# Patient Record
Sex: MALE | Race: WHITE
[De-identification: names, ages, dates, MRNs, and addresses within clinical notes are randomized per-mention and may not be internally consistent; named-entity substitution may affect disease eponyms.]

---

## 2023-03-13 PROBLEM — Z00.00 ENCOUNTER FOR PREVENTIVE HEALTH EXAMINATION: Status: ACTIVE | Noted: 2023-03-13

## 2023-03-14 ENCOUNTER — APPOINTMENT (OUTPATIENT)
Dept: CARDIOLOGY | Facility: CLINIC | Age: 63
End: 2023-03-14
Payer: MEDICARE

## 2023-03-14 VITALS
SYSTOLIC BLOOD PRESSURE: 142 MMHG | BODY MASS INDEX: 24.11 KG/M2 | WEIGHT: 178 LBS | HEIGHT: 72 IN | DIASTOLIC BLOOD PRESSURE: 90 MMHG

## 2023-03-14 DIAGNOSIS — E11.9 TYPE 2 DIABETES MELLITUS W/OUT COMPLICATIONS: ICD-10-CM

## 2023-03-14 DIAGNOSIS — I25.10 ATHEROSCLEROTIC HEART DISEASE OF NATIVE CORONARY ARTERY W/OUT ANGINA PECTORIS: ICD-10-CM

## 2023-03-14 DIAGNOSIS — E78.5 HYPERLIPIDEMIA, UNSPECIFIED: ICD-10-CM

## 2023-03-14 DIAGNOSIS — Z82.49 FAMILY HISTORY OF ISCHEMIC HEART DISEASE AND OTHER DISEASES OF THE CIRCULATORY SYSTEM: ICD-10-CM

## 2023-03-14 DIAGNOSIS — I10 ESSENTIAL (PRIMARY) HYPERTENSION: ICD-10-CM

## 2023-03-14 DIAGNOSIS — Z95.1 PRESENCE OF AORTOCORONARY BYPASS GRAFT: ICD-10-CM

## 2023-03-14 DIAGNOSIS — Z86.79 PERSONAL HISTORY OF OTHER DISEASES OF THE CIRCULATORY SYSTEM: ICD-10-CM

## 2023-03-14 DIAGNOSIS — Z87.891 PERSONAL HISTORY OF NICOTINE DEPENDENCE: ICD-10-CM

## 2023-03-14 PROCEDURE — 99204 OFFICE O/P NEW MOD 45 MIN: CPT | Mod: 25

## 2023-03-14 PROCEDURE — 36415 COLL VENOUS BLD VENIPUNCTURE: CPT

## 2023-03-14 PROCEDURE — 93000 ELECTROCARDIOGRAM COMPLETE: CPT

## 2023-03-14 RX ORDER — CLOPIDOGREL BISULFATE 75 MG/1
75 TABLET, FILM COATED ORAL DAILY
Qty: 90 | Refills: 3 | Status: ACTIVE | COMMUNITY
Start: 1900-01-01 | End: 1900-01-01

## 2023-03-14 RX ORDER — METOPROLOL SUCCINATE 25 MG/1
25 TABLET, EXTENDED RELEASE ORAL DAILY
Qty: 90 | Refills: 3 | Status: ACTIVE | COMMUNITY
Start: 1900-01-01 | End: 1900-01-01

## 2023-03-14 RX ORDER — ATORVASTATIN CALCIUM 40 MG/1
40 TABLET, FILM COATED ORAL
Qty: 90 | Refills: 3 | Status: ACTIVE | COMMUNITY
Start: 1900-01-01 | End: 1900-01-01

## 2023-03-14 RX ORDER — LISINOPRIL 20 MG/1
20 TABLET ORAL DAILY
Qty: 90 | Refills: 3 | Status: ACTIVE | COMMUNITY
Start: 1900-01-01 | End: 1900-01-01

## 2023-03-14 RX ORDER — METFORMIN HYDROCHLORIDE 1000 MG/1
1000 TABLET, COATED ORAL
Qty: 180 | Refills: 3 | Status: ACTIVE | COMMUNITY
Start: 1900-01-01 | End: 1900-01-01

## 2023-03-14 RX ORDER — EZETIMIBE 10 MG/1
10 TABLET ORAL DAILY
Qty: 90 | Refills: 0 | Status: ACTIVE | COMMUNITY
Start: 1900-01-01 | End: 1900-01-01

## 2023-03-14 NOTE — REASON FOR VISIT
[Other: ____] : [unfilled] [FreeTextEntry1] : Diagnostic Tests:\par ---------------------\par EKG: \par 03/14/23-NSR. STD in inferior leads.

## 2023-03-14 NOTE — HISTORY OF PRESENT ILLNESS
[FreeTextEntry1] : Mr. Parrish is a 63yo M with PMHx of CAD s/p CABG (2017) and PCI (2-3 years), HTN, HLD, DMII who presents to [facility] care. He currently does not have PMD.  He recently moved from [place] where he followed with Dr Cody Haines ([facility]). He feels somewhat down due to stress in his life. He will get some chest discomfort with emotional distress. He is able to walk about 2 miles without symptoms.

## 2023-03-14 NOTE — ASSESSMENT
[FreeTextEntry1] : CAD s/p CABG: Will need to obtain records\par -Check TTE and Lexiscan Nuclear stress. \par -Continue with ASA, plavix, Toprol 25mg, lisinopril 20mg PO and atorvastatin 40mg PO daily. \par -Check labs. \par \par HTN: BP not at goal per ACC/AHA 2018 guidelines\par -Patient has run out of some meds and not taking consistently. \par -Refilled meds.\par -Pt to check BP at home 3 days a week/twice a day. \par -Will keep log and bring to next visit.\par -Continue with lisinopril and metoprolol. \par \par HLD: Need labs\par \par DM: Need A1c\par \par Follow up in 3 months\par -Medicine referral for primary care.

## 2023-03-14 NOTE — REVIEW OF SYSTEMS
[Negative] : Heme/Lymph [Chest Discomfort] : chest discomfort [SOB] : no shortness of breath [Dyspnea on exertion] : not dyspnea during exertion [Palpitations] : no palpitations

## 2023-03-15 LAB
ALBUMIN SERPL ELPH-MCNC: 5.3 G/DL
ALP BLD-CCNC: 139 U/L
ALT SERPL-CCNC: 32 U/L
ANION GAP SERPL CALC-SCNC: 17 MMOL/L
AST SERPL-CCNC: 23 U/L
BASOPHILS # BLD AUTO: 0.07 K/UL
BASOPHILS NFR BLD AUTO: 0.6 %
BILIRUB SERPL-MCNC: 0.6 MG/DL
BUN SERPL-MCNC: 11 MG/DL
CALCIUM SERPL-MCNC: 9.9 MG/DL
CHLORIDE SERPL-SCNC: 101 MMOL/L
CHOLEST SERPL-MCNC: 193 MG/DL
CO2 SERPL-SCNC: 22 MMOL/L
CREAT SERPL-MCNC: 0.9 MG/DL
EGFR: 96 ML/MIN/1.73M2
EOSINOPHIL # BLD AUTO: 0.02 K/UL
EOSINOPHIL NFR BLD AUTO: 0.2 %
ESTIMATED AVERAGE GLUCOSE: 183 MG/DL
GLUCOSE SERPL-MCNC: 138 MG/DL
HBA1C MFR BLD HPLC: 8 %
HCT VFR BLD CALC: 44 %
HDLC SERPL-MCNC: 49 MG/DL
HGB BLD-MCNC: 15.2 G/DL
IMM GRANULOCYTES NFR BLD AUTO: 0.4 %
LDLC SERPL CALC-MCNC: 96 MG/DL
LYMPHOCYTES # BLD AUTO: 1.53 K/UL
LYMPHOCYTES NFR BLD AUTO: 13.6 %
MAN DIFF?: NORMAL
MCHC RBC-ENTMCNC: 31 PG
MCHC RBC-ENTMCNC: 34.5 G/DL
MCV RBC AUTO: 89.6 FL
MONOCYTES # BLD AUTO: 0.6 K/UL
MONOCYTES NFR BLD AUTO: 5.3 %
NEUTROPHILS # BLD AUTO: 9.01 K/UL
NEUTROPHILS NFR BLD AUTO: 79.9 %
NONHDLC SERPL-MCNC: 144 MG/DL
PLATELET # BLD AUTO: 276 K/UL
POTASSIUM SERPL-SCNC: 4.1 MMOL/L
PROT SERPL-MCNC: 7.8 G/DL
RBC # BLD: 4.91 M/UL
RBC # FLD: 12.2 %
SODIUM SERPL-SCNC: 140 MMOL/L
TRIGL SERPL-MCNC: 241 MG/DL
TSH SERPL-ACNC: 2.33 UIU/ML
WBC # FLD AUTO: 11.27 K/UL

## 2023-04-11 ENCOUNTER — RX RENEWAL (OUTPATIENT)
Age: 63
End: 2023-04-11

## 2023-04-11 RX ORDER — SAXAGLIPTIN 5 MG/1
5 TABLET, FILM COATED ORAL DAILY
Qty: 30 | Refills: 0 | Status: ACTIVE | COMMUNITY
Start: 2023-04-11 | End: 1900-01-01

## 2023-05-25 ENCOUNTER — APPOINTMENT (OUTPATIENT)
Dept: CV DIAGNOSITCS | Facility: HOSPITAL | Age: 63
End: 2023-05-25

## 2023-06-11 NOTE — HISTORY OF PRESENT ILLNESS
[FreeTextEntry1] : Mr. Parrish is a 63yo M with PMHx of CAD s/p CABG (2017) and PCI (2-3 years), HTN, HLD, DMII who [facility]. He currently does not have PMD.  He recently moved from [place] where he followed with Dr Cody Haines ([facility]). He feels somewhat down due to stress in his life. He will get some chest discomfort with emotional distress. He is able to walk about 2 miles without symptoms.

## 2023-06-11 NOTE — REVIEW OF SYSTEMS
[SOB] : no shortness of breath [Dyspnea on exertion] : not dyspnea during exertion [Chest Discomfort] : chest discomfort [Palpitations] : no palpitations [Negative] : Heme/Lymph

## 2023-06-11 NOTE — ASSESSMENT
[FreeTextEntry1] : CAD s/p CABG: Will need to obtain records\par -Check TTE and Lexiscan Nuclear stress. \par -Continue with ASA, plavix, Toprol 25mg, lisinopril 20mg PO and atorvastatin 40mg PO daily. \par -Check labs. \par \par HTN: BP not at goal per ACC/AHA 2018 guidelines\par -Patient has run out of some meds and not taking consistently. \par -Refilled meds.\par -Pt to check BP at home 3 days a week/twice a day. \par -Will keep log and bring to next visit.\par -Continue with lisinopril and metoprolol. \par \par HLD: , , HDL 49, LDL 96\par \par DM: HA1c 8% (03/23)\par \par Follow up in 3 months\par -Medicine referral for primary care.

## 2023-06-12 ENCOUNTER — APPOINTMENT (OUTPATIENT)
Dept: CARDIOLOGY | Facility: CLINIC | Age: 63
End: 2023-06-12

## 2023-10-08 ENCOUNTER — INPATIENT (INPATIENT)
Facility: HOSPITAL | Age: 63
LOS: 2 days | Discharge: HOME | DRG: 117 | End: 2023-10-11
Attending: INTERNAL MEDICINE | Admitting: FAMILY MEDICINE
Payer: MEDICARE

## 2023-10-08 VITALS
WEIGHT: 179.9 LBS | OXYGEN SATURATION: 99 % | RESPIRATION RATE: 18 BRPM | HEART RATE: 74 BPM | DIASTOLIC BLOOD PRESSURE: 139 MMHG | SYSTOLIC BLOOD PRESSURE: 220 MMHG | TEMPERATURE: 98 F

## 2023-10-08 LAB
ALBUMIN SERPL ELPH-MCNC: 5.2 G/DL — SIGNIFICANT CHANGE UP (ref 3.5–5.2)
ALP SERPL-CCNC: 164 U/L — HIGH (ref 30–115)
ALT FLD-CCNC: 28 U/L — SIGNIFICANT CHANGE UP (ref 0–41)
ANION GAP SERPL CALC-SCNC: 12 MMOL/L — SIGNIFICANT CHANGE UP (ref 7–14)
APTT BLD: 42.1 SEC — HIGH (ref 27–39.2)
AST SERPL-CCNC: 23 U/L — SIGNIFICANT CHANGE UP (ref 0–41)
BASOPHILS # BLD AUTO: 0.04 K/UL — SIGNIFICANT CHANGE UP (ref 0–0.2)
BASOPHILS NFR BLD AUTO: 0.5 % — SIGNIFICANT CHANGE UP (ref 0–1)
BILIRUB SERPL-MCNC: 0.4 MG/DL — SIGNIFICANT CHANGE UP (ref 0.2–1.2)
BUN SERPL-MCNC: 14 MG/DL — SIGNIFICANT CHANGE UP (ref 10–20)
CALCIUM SERPL-MCNC: 10.1 MG/DL — SIGNIFICANT CHANGE UP (ref 8.4–10.5)
CHLORIDE SERPL-SCNC: 102 MMOL/L — SIGNIFICANT CHANGE UP (ref 98–110)
CO2 SERPL-SCNC: 26 MMOL/L — SIGNIFICANT CHANGE UP (ref 17–32)
CREAT SERPL-MCNC: 0.9 MG/DL — SIGNIFICANT CHANGE UP (ref 0.7–1.5)
EGFR: 95 ML/MIN/1.73M2 — SIGNIFICANT CHANGE UP
EOSINOPHIL # BLD AUTO: 0.06 K/UL — SIGNIFICANT CHANGE UP (ref 0–0.7)
EOSINOPHIL NFR BLD AUTO: 0.7 % — SIGNIFICANT CHANGE UP (ref 0–8)
GLUCOSE SERPL-MCNC: 148 MG/DL — HIGH (ref 70–99)
HCT VFR BLD CALC: 45.8 % — SIGNIFICANT CHANGE UP (ref 42–52)
HGB BLD-MCNC: 16 G/DL — SIGNIFICANT CHANGE UP (ref 14–18)
IMM GRANULOCYTES NFR BLD AUTO: 0.3 % — SIGNIFICANT CHANGE UP (ref 0.1–0.3)
INR BLD: 1.03 RATIO — SIGNIFICANT CHANGE UP (ref 0.65–1.3)
LYMPHOCYTES # BLD AUTO: 1.93 K/UL — SIGNIFICANT CHANGE UP (ref 1.2–3.4)
LYMPHOCYTES # BLD AUTO: 22.1 % — SIGNIFICANT CHANGE UP (ref 20.5–51.1)
MCHC RBC-ENTMCNC: 30.5 PG — SIGNIFICANT CHANGE UP (ref 27–31)
MCHC RBC-ENTMCNC: 34.9 G/DL — SIGNIFICANT CHANGE UP (ref 32–37)
MCV RBC AUTO: 87.2 FL — SIGNIFICANT CHANGE UP (ref 80–94)
MONOCYTES # BLD AUTO: 0.54 K/UL — SIGNIFICANT CHANGE UP (ref 0.1–0.6)
MONOCYTES NFR BLD AUTO: 6.2 % — SIGNIFICANT CHANGE UP (ref 1.7–9.3)
NEUTROPHILS # BLD AUTO: 6.15 K/UL — SIGNIFICANT CHANGE UP (ref 1.4–6.5)
NEUTROPHILS NFR BLD AUTO: 70.2 % — SIGNIFICANT CHANGE UP (ref 42.2–75.2)
NRBC # BLD: 0 /100 WBCS — SIGNIFICANT CHANGE UP (ref 0–0)
PLATELET # BLD AUTO: 245 K/UL — SIGNIFICANT CHANGE UP (ref 130–400)
PMV BLD: 9.5 FL — SIGNIFICANT CHANGE UP (ref 7.4–10.4)
POTASSIUM SERPL-MCNC: 4 MMOL/L — SIGNIFICANT CHANGE UP (ref 3.5–5)
POTASSIUM SERPL-SCNC: 4 MMOL/L — SIGNIFICANT CHANGE UP (ref 3.5–5)
PROT SERPL-MCNC: 8.2 G/DL — HIGH (ref 6–8)
PROTHROM AB SERPL-ACNC: 11.8 SEC — SIGNIFICANT CHANGE UP (ref 9.95–12.87)
RBC # BLD: 5.25 M/UL — SIGNIFICANT CHANGE UP (ref 4.7–6.1)
RBC # FLD: 12.1 % — SIGNIFICANT CHANGE UP (ref 11.5–14.5)
SODIUM SERPL-SCNC: 140 MMOL/L — SIGNIFICANT CHANGE UP (ref 135–146)
TROPONIN T SERPL-MCNC: <0.01 NG/ML — SIGNIFICANT CHANGE UP
WBC # BLD: 8.75 K/UL — SIGNIFICANT CHANGE UP (ref 4.8–10.8)
WBC # FLD AUTO: 8.75 K/UL — SIGNIFICANT CHANGE UP (ref 4.8–10.8)

## 2023-10-08 PROCEDURE — 99285 EMERGENCY DEPT VISIT HI MDM: CPT

## 2023-10-08 PROCEDURE — 70450 CT HEAD/BRAIN W/O DYE: CPT | Mod: 26,MA

## 2023-10-08 PROCEDURE — 70498 CT ANGIOGRAPHY NECK: CPT | Mod: 26,MA

## 2023-10-08 PROCEDURE — 70496 CT ANGIOGRAPHY HEAD: CPT | Mod: 26,MA

## 2023-10-08 PROCEDURE — 93010 ELECTROCARDIOGRAM REPORT: CPT

## 2023-10-08 PROCEDURE — 70480 CT ORBIT/EAR/FOSSA W/O DYE: CPT | Mod: 26,59,MA

## 2023-10-08 NOTE — ED PROVIDER NOTE - OBJECTIVE STATEMENT
63-year-old male with past medical history of HTN, HLD, DM, and CAD s/p CABG/stents presents to the ED for evaluation of right eye visual changes that started on Tuesday and have been worsening.  Patient initially saw some floaters in his right eye but progressively vision has been worsening where he now only sees lights and the top part of his visual field.  He denies history of similar symptoms in the past.  He denies other complaints. Pt denies fever, chills, nausea, vomiting, abdominal pain, diarrhea, headache, dizziness, weakness, chest pain, SOB, back pain, LOC, trauma, urinary symptoms, cough, calf pain/swelling, recent travel, recent surgery.

## 2023-10-08 NOTE — ED ADULT TRIAGE NOTE - CHIEF COMPLAINT QUOTE
pt states he has a floater in his right eye started tuesday. pt /139 on triage pt states he did not take his BP meds.

## 2023-10-08 NOTE — ED PROVIDER NOTE - PROGRESS NOTE DETAILS
Discussed case with ophthalmology, agrees that patient has retinal detachment, recommend transfer [place] so he can evaluate patient in a.m. Discussed case with ophthalmology, agrees that patient has retinal detachment, recommends transfer North so he can evaluate patient in a.m.

## 2023-10-08 NOTE — ED PROVIDER NOTE - ATTENDING APP SHARED VISIT CONTRIBUTION OF CARE
63-year-old male, history of HTN, DM, HLD, CABG, presents with vision changes on the right eye which started last Tuesday.  States his symptoms started as floaters followed by worsening vision and seeing colors.  No headache, slurred speech or focal weakness.  Exam shows alert patient in no distress, HEENT NCAT PERRL, neck supple, lungs clear, RR S1S2, abdomen soft NT +BS, no CCE, skin no rash, neuro A&OX3 GCS 15 no deficits.

## 2023-10-08 NOTE — ED PROVIDER NOTE - CLINICAL SUMMARY MEDICAL DECISION MAKING FREE TEXT BOX
63-year-old male, history of HTN, DM, HLD, CABG, presents to the ED with vision changes in the right eye since Tuesday.  Only able to see light on the right eye, unable to count fingers.  Bedside sono consistent with detached retina.  CT head negative.  CTA head and neck no LVO.  Discussed with ophthalmology who recommends admission.  Ophthalmology will evaluate him in a.m.  Will admit [place].

## 2023-10-08 NOTE — ED PROVIDER NOTE - NS ED ATTENDING STATEMENT MOD
This was a shared visit with the SHELIA. I reviewed and verified the documentation and independently performed the documented:

## 2023-10-08 NOTE — ED PROVIDER NOTE - PHYSICAL EXAMINATION
VITAL SIGNS: I have reviewed nursing notes and confirm.  CONSTITUTIONAL: 62 yo M laying on stretcher; in no acute distress.  SKIN: Skin exam is warm and dry, no acute rash.  HEAD: Normocephalic; atraumatic.  EYES: PERRL, EOM intact; conjunctiva and sclera clear.  ENT: No nasal discharge; airway clear.   NECK: Supple; non tender.  CARD: S1, S2 normal; no murmurs, gallops, or rubs. Regular rate and rhythm.  RESP: No wheezes, rales or rhonchi. Speaking in full sentences.   ABD: Normal bowel sounds; soft; non-distended; non-tender; No rebound or guarding. No CVA tenderness.  EXT: Normal ROM. No clubbing, cyanosis or edema.  NEURO: Alert, oriented x 4. Unable to see out of R eye, otherwise exam non-focal. VITAL SIGNS: I have reviewed nursing notes and confirm.  CONSTITUTIONAL: 62 yo M laying on stretcher; in no acute distress.  SKIN: Skin exam is warm and dry, no acute rash.  HEAD: Normocephalic; atraumatic.  EYES: PERRL, EOM intact; conjunctiva and sclera clear. POCUS shows R retinal detachment.   ENT: No nasal discharge; airway clear.   NECK: Supple; non tender.  CARD: S1, S2 normal; no murmurs, gallops, or rubs. Regular rate and rhythm.  RESP: No wheezes, rales or rhonchi. Speaking in full sentences.   ABD: Normal bowel sounds; soft; non-distended; non-tender; No rebound or guarding. No CVA tenderness.  EXT: Normal ROM. No clubbing, cyanosis or edema.  NEURO: Alert, oriented x 4. Unable to see out of R eye, otherwise exam non-focal.

## 2023-10-09 DIAGNOSIS — H33.21 SEROUS RETINAL DETACHMENT, RIGHT EYE: ICD-10-CM

## 2023-10-09 DIAGNOSIS — Z95.5 PRESENCE OF CORONARY ANGIOPLASTY IMPLANT AND GRAFT: Chronic | ICD-10-CM

## 2023-10-09 LAB
ANION GAP SERPL CALC-SCNC: 13 MMOL/L — SIGNIFICANT CHANGE UP (ref 7–14)
BASOPHILS # BLD AUTO: 0.06 K/UL — SIGNIFICANT CHANGE UP (ref 0–0.2)
BASOPHILS NFR BLD AUTO: 0.8 % — SIGNIFICANT CHANGE UP (ref 0–1)
BUN SERPL-MCNC: 13 MG/DL — SIGNIFICANT CHANGE UP (ref 10–20)
CALCIUM SERPL-MCNC: 9.7 MG/DL — SIGNIFICANT CHANGE UP (ref 8.4–10.5)
CHLORIDE SERPL-SCNC: 99 MMOL/L — SIGNIFICANT CHANGE UP (ref 98–110)
CO2 SERPL-SCNC: 27 MMOL/L — SIGNIFICANT CHANGE UP (ref 17–32)
CREAT SERPL-MCNC: 0.8 MG/DL — SIGNIFICANT CHANGE UP (ref 0.7–1.5)
EGFR: 99 ML/MIN/1.73M2 — SIGNIFICANT CHANGE UP
EOSINOPHIL # BLD AUTO: 0.1 K/UL — SIGNIFICANT CHANGE UP (ref 0–0.7)
EOSINOPHIL NFR BLD AUTO: 1.3 % — SIGNIFICANT CHANGE UP (ref 0–8)
GLUCOSE BLDC GLUCOMTR-MCNC: 171 MG/DL — HIGH (ref 70–99)
GLUCOSE BLDC GLUCOMTR-MCNC: 176 MG/DL — HIGH (ref 70–99)
GLUCOSE BLDC GLUCOMTR-MCNC: 200 MG/DL — HIGH (ref 70–99)
GLUCOSE SERPL-MCNC: 184 MG/DL — HIGH (ref 70–99)
HCT VFR BLD CALC: 43 % — SIGNIFICANT CHANGE UP (ref 42–52)
HGB BLD-MCNC: 14.3 G/DL — SIGNIFICANT CHANGE UP (ref 14–18)
IMM GRANULOCYTES NFR BLD AUTO: 0.4 % — HIGH (ref 0.1–0.3)
LYMPHOCYTES # BLD AUTO: 1.36 K/UL — SIGNIFICANT CHANGE UP (ref 1.2–3.4)
LYMPHOCYTES # BLD AUTO: 18 % — LOW (ref 20.5–51.1)
MAGNESIUM SERPL-MCNC: 2.2 MG/DL — SIGNIFICANT CHANGE UP (ref 1.8–2.4)
MCHC RBC-ENTMCNC: 29.9 PG — SIGNIFICANT CHANGE UP (ref 27–31)
MCHC RBC-ENTMCNC: 33.3 G/DL — SIGNIFICANT CHANGE UP (ref 32–37)
MCV RBC AUTO: 89.8 FL — SIGNIFICANT CHANGE UP (ref 80–94)
MONOCYTES # BLD AUTO: 0.51 K/UL — SIGNIFICANT CHANGE UP (ref 0.1–0.6)
MONOCYTES NFR BLD AUTO: 6.8 % — SIGNIFICANT CHANGE UP (ref 1.7–9.3)
NEUTROPHILS # BLD AUTO: 5.48 K/UL — SIGNIFICANT CHANGE UP (ref 1.4–6.5)
NEUTROPHILS NFR BLD AUTO: 72.7 % — SIGNIFICANT CHANGE UP (ref 42.2–75.2)
NRBC # BLD: 0 /100 WBCS — SIGNIFICANT CHANGE UP (ref 0–0)
PLATELET # BLD AUTO: 256 K/UL — SIGNIFICANT CHANGE UP (ref 130–400)
PMV BLD: 10.2 FL — SIGNIFICANT CHANGE UP (ref 7.4–10.4)
POTASSIUM SERPL-MCNC: 4 MMOL/L — SIGNIFICANT CHANGE UP (ref 3.5–5)
POTASSIUM SERPL-SCNC: 4 MMOL/L — SIGNIFICANT CHANGE UP (ref 3.5–5)
RBC # BLD: 4.79 M/UL — SIGNIFICANT CHANGE UP (ref 4.7–6.1)
RBC # FLD: 12.4 % — SIGNIFICANT CHANGE UP (ref 11.5–14.5)
SODIUM SERPL-SCNC: 139 MMOL/L — SIGNIFICANT CHANGE UP (ref 135–146)
WBC # BLD: 7.54 K/UL — SIGNIFICANT CHANGE UP (ref 4.8–10.8)
WBC # FLD AUTO: 7.54 K/UL — SIGNIFICANT CHANGE UP (ref 4.8–10.8)

## 2023-10-09 PROCEDURE — 87521 HEPATITIS C PROBE&RVRS TRNSC: CPT

## 2023-10-09 PROCEDURE — C1889: CPT

## 2023-10-09 PROCEDURE — 70551 MRI BRAIN STEM W/O DYE: CPT | Mod: 26

## 2023-10-09 PROCEDURE — 99497 ADVNCD CARE PLAN 30 MIN: CPT | Mod: 25

## 2023-10-09 PROCEDURE — 76512 OPH US DX B-SCAN: CPT | Mod: 50

## 2023-10-09 PROCEDURE — 80048 BASIC METABOLIC PNL TOTAL CA: CPT

## 2023-10-09 PROCEDURE — 99222 1ST HOSP IP/OBS MODERATE 55: CPT

## 2023-10-09 PROCEDURE — 82962 GLUCOSE BLOOD TEST: CPT

## 2023-10-09 PROCEDURE — 70551 MRI BRAIN STEM W/O DYE: CPT

## 2023-10-09 PROCEDURE — 85025 COMPLETE CBC W/AUTO DIFF WBC: CPT

## 2023-10-09 PROCEDURE — C1900: CPT

## 2023-10-09 PROCEDURE — 76512 OPH US DX B-SCAN: CPT | Mod: 26,50

## 2023-10-09 PROCEDURE — 83735 ASSAY OF MAGNESIUM: CPT

## 2023-10-09 PROCEDURE — 87522 HEPATITIS C REVRS TRNSCRPJ: CPT

## 2023-10-09 PROCEDURE — 36415 COLL VENOUS BLD VENIPUNCTURE: CPT

## 2023-10-09 PROCEDURE — 86803 HEPATITIS C AB TEST: CPT

## 2023-10-09 PROCEDURE — 71045 X-RAY EXAM CHEST 1 VIEW: CPT

## 2023-10-09 PROCEDURE — 80053 COMPREHEN METABOLIC PANEL: CPT

## 2023-10-09 PROCEDURE — 93005 ELECTROCARDIOGRAM TRACING: CPT

## 2023-10-09 PROCEDURE — ZZZZZ: CPT

## 2023-10-09 RX ORDER — CLOPIDOGREL BISULFATE 75 MG/1
75 TABLET, FILM COATED ORAL DAILY
Refills: 0 | Status: DISCONTINUED | OUTPATIENT
Start: 2023-10-09 | End: 2023-10-09

## 2023-10-09 RX ORDER — GLUCAGON INJECTION, SOLUTION 0.5 MG/.1ML
1 INJECTION, SOLUTION SUBCUTANEOUS ONCE
Refills: 0 | Status: ACTIVE | OUTPATIENT
Start: 2023-10-09 | End: 2024-09-06

## 2023-10-09 RX ORDER — INFLUENZA VIRUS VACCINE 15; 15; 15; 15 UG/.5ML; UG/.5ML; UG/.5ML; UG/.5ML
0.5 SUSPENSION INTRAMUSCULAR ONCE
Refills: 0 | Status: ACTIVE | OUTPATIENT
Start: 2023-10-09 | End: 2024-09-06

## 2023-10-09 RX ORDER — ASPIRIN/CALCIUM CARB/MAGNESIUM 324 MG
325 TABLET ORAL ONCE
Refills: 0 | Status: COMPLETED | OUTPATIENT
Start: 2023-10-09 | End: 2023-10-09

## 2023-10-09 RX ORDER — LISINOPRIL 2.5 MG/1
20 TABLET ORAL ONCE
Refills: 0 | Status: COMPLETED | OUTPATIENT
Start: 2023-10-09 | End: 2023-10-09

## 2023-10-09 RX ORDER — ASPIRIN/CALCIUM CARB/MAGNESIUM 324 MG
81 TABLET ORAL DAILY
Refills: 0 | Status: ACTIVE | OUTPATIENT
Start: 2023-10-09 | End: 2024-09-06

## 2023-10-09 RX ORDER — ACETAMINOPHEN 500 MG
650 TABLET ORAL EVERY 6 HOURS
Refills: 0 | Status: ACTIVE | OUTPATIENT
Start: 2023-10-09 | End: 2024-09-06

## 2023-10-09 RX ORDER — INSULIN LISPRO 100/ML
VIAL (ML) SUBCUTANEOUS
Refills: 0 | Status: ACTIVE | OUTPATIENT
Start: 2023-10-09 | End: 2024-09-06

## 2023-10-09 RX ORDER — METOPROLOL TARTRATE 50 MG
25 TABLET ORAL DAILY
Refills: 0 | Status: ACTIVE | OUTPATIENT
Start: 2023-10-09 | End: 2024-09-06

## 2023-10-09 RX ORDER — PANTOPRAZOLE SODIUM 20 MG/1
40 TABLET, DELAYED RELEASE ORAL
Refills: 0 | Status: ACTIVE | OUTPATIENT
Start: 2023-10-09 | End: 2024-09-06

## 2023-10-09 RX ORDER — DEXTROSE 50 % IN WATER 50 %
25 SYRINGE (ML) INTRAVENOUS ONCE
Refills: 0 | Status: ACTIVE | OUTPATIENT
Start: 2023-10-09

## 2023-10-09 RX ORDER — SODIUM CHLORIDE 9 MG/ML
1000 INJECTION, SOLUTION INTRAVENOUS
Refills: 0 | Status: ACTIVE | OUTPATIENT
Start: 2023-10-09 | End: 2024-09-06

## 2023-10-09 RX ORDER — LISINOPRIL 2.5 MG/1
20 TABLET ORAL DAILY
Refills: 0 | Status: DISCONTINUED | OUTPATIENT
Start: 2023-10-09 | End: 2023-10-10

## 2023-10-09 RX ORDER — TICAGRELOR 90 MG/1
90 TABLET ORAL
Refills: 0 | Status: DISCONTINUED | OUTPATIENT
Start: 2023-10-09 | End: 2023-10-10

## 2023-10-09 RX ORDER — DEXTROSE 50 % IN WATER 50 %
15 SYRINGE (ML) INTRAVENOUS ONCE
Refills: 0 | Status: ACTIVE | OUTPATIENT
Start: 2023-10-09 | End: 2024-09-06

## 2023-10-09 RX ORDER — HEPARIN SODIUM 5000 [USP'U]/ML
5000 INJECTION INTRAVENOUS; SUBCUTANEOUS EVERY 12 HOURS
Refills: 0 | Status: ACTIVE | OUTPATIENT
Start: 2023-10-09 | End: 2024-09-06

## 2023-10-09 RX ORDER — ATORVASTATIN CALCIUM 80 MG/1
40 TABLET, FILM COATED ORAL AT BEDTIME
Refills: 0 | Status: ACTIVE | OUTPATIENT
Start: 2023-10-09 | End: 2024-09-06

## 2023-10-09 RX ADMIN — LISINOPRIL 20 MILLIGRAM(S): 2.5 TABLET ORAL at 05:47

## 2023-10-09 RX ADMIN — ATORVASTATIN CALCIUM 40 MILLIGRAM(S): 80 TABLET, FILM COATED ORAL at 21:22

## 2023-10-09 RX ADMIN — Medication 81 MILLIGRAM(S): at 11:59

## 2023-10-09 RX ADMIN — Medication 1: at 12:00

## 2023-10-09 RX ADMIN — Medication 325 MILLIGRAM(S): at 00:37

## 2023-10-09 RX ADMIN — CLOPIDOGREL BISULFATE 75 MILLIGRAM(S): 75 TABLET, FILM COATED ORAL at 11:59

## 2023-10-09 RX ADMIN — Medication 1: at 17:55

## 2023-10-09 RX ADMIN — HEPARIN SODIUM 5000 UNIT(S): 5000 INJECTION INTRAVENOUS; SUBCUTANEOUS at 18:05

## 2023-10-09 RX ADMIN — TICAGRELOR 90 MILLIGRAM(S): 90 TABLET ORAL at 18:03

## 2023-10-09 RX ADMIN — LISINOPRIL 20 MILLIGRAM(S): 2.5 TABLET ORAL at 01:19

## 2023-10-09 RX ADMIN — PANTOPRAZOLE SODIUM 40 MILLIGRAM(S): 20 TABLET, DELAYED RELEASE ORAL at 08:48

## 2023-10-09 NOTE — H&P ADULT - NSHPPHYSICALEXAM_GEN_ALL_CORE
Vital Signs Last 24 Hrs  T(C): 36.6 (08 Oct 2023 20:02), Max: 36.6 (08 Oct 2023 20:02)  T(F): 97.9 (08 Oct 2023 20:02), Max: 97.9 (08 Oct 2023 20:02)  HR: 48 (09 Oct 2023 01:19) (48 - 74)  BP: 173/79 (09 Oct 2023 01:19) (170/90 - 220/139)  RR: 17 (09 Oct 2023 01:19) (17 - 18)  SpO2: 98% (09 Oct 2023 01:19) (98% - 99%)    Parameters below as of 09 Oct 2023 01:19  Patient On (Oxygen Delivery Method): room air    GENERAL: NAD, lying in bed comfortably  HEAD:  Atraumatic, Normocephalic  EYES: EOMI, PERRLA, conjunctiva and sclera clear  ENT: Moist mucous membranes  NECK: Supple, No JVD  CHEST/LUNG: Clear to auscultation bilaterally; No rales, rhonchi, wheezing, or rubs. Unlabored respirations  HEART: Regular rate and rhythm; No murmurs, rubs, or gallops  ABDOMEN: Bowel sounds present; Soft, Nontender, Nondistended. No hepatomegally  EXTREMITIES:  2+ Peripheral Pulses, brisk capillary refill. No clubbing, cyanosis, or edema  NERVOUS SYSTEM:  Alert & Oriented X3, speech clear. No deficits   MSK: FROM all 4 extremities, full and equal strength  SKIN: No rashes or lesions

## 2023-10-09 NOTE — PATIENT PROFILE ADULT - FALL HARM RISK - HARM RISK INTERVENTIONS
Assistance with ambulation/Assistance OOB with selected safe patient handling equipment/Communicate Risk of Fall with Harm to all staff/Orthostatic vital signs/Reinforce activity limits and safety measures with patient and family/Tailored Fall Risk Interventions/Visual Cue: Yellow wristband and red socks/Bed in lowest position, wheels locked, appropriate side rails in place/Call bell, personal items and telephone in reach/Instruct patient to call for assistance before getting out of bed or chair/Non-slip footwear when patient is out of bed/[place] to call system/Physically safe environment - no spills, clutter or unnecessary equipment/Purposeful Proactive Rounding/Room/bathroom lighting operational, light cord in reach

## 2023-10-09 NOTE — H&P ADULT - HISTORY OF PRESENT ILLNESS
Patient is a 62 y/o M with a pmhx of DM, HTN, HLD, CAD s/p CABG/ stents who presents with right eye floaters associated with a headache. Patient states similar episode occurred 2 months ago and subsided on its own. However, 1 week ago symptoms re-occurred and has been progressively since. Describes it as a "covering to the eye" and only able to see a green light from the upper part of the visual field. No visual changes to the left eye. Denies trauma,  fever, chills, nausea, vomiting, abdominal pain, diarrhea, dizziness, weakness, chest pain, SOB, back pain, LOC, trauma, urinary symptoms, cough, calf pain/swelling, recent travel, recent surgery.

## 2023-10-09 NOTE — H&P ADULT - NSHPLABSRESULTS_GEN_ALL_CORE
LABS:                          16.0   8.75  )-----------( 245      ( 08 Oct 2023 20:33 )             45.8     10-08    140  |  102  |  14  ----------------------------<  148<H>  4.0   |  26  |  0.9    Ca    10.1      08 Oct 2023 20:33    TPro  8.2<H>  /  Alb  5.2  /  TBili  0.4  /  DBili  x   /  AST  23  /  ALT  28  /  AlkPhos  164<H>  10-08    LIVER FUNCTIONS - ( 08 Oct 2023 20:33 )  Alb: 5.2 g/dL / Pro: 8.2 g/dL / ALK PHOS: 164 U/L / ALT: 28 U/L / AST: 23 U/L / GGT: x           PT/INR - ( 08 Oct 2023 20:33 )   PT: 11.80 sec;   INR: 1.03 ratio         PTT - ( 08 Oct 2023 20:33 )  PTT:42.1 sec  Urinalysis Basic - ( 08 Oct 2023 20:33 )    Color: x / Appearance: x / SG: x / pH: x  Gluc: 148 mg/dL / Ketone: x  / Bili: x / Urobili: x   Blood: x / Protein: x / Nitrite: x   Leuk Esterase: x / RBC: x / WBC x   Sq Epi: x / Non Sq Epi: x / Bacteria: x LABS:                          16.0   8.75  )-----------( 245      ( 08 Oct 2023 20:33 )             45.8     10-08    140  |  102  |  14  ----------------------------<  148<H>  4.0   |  26  |  0.9    Ca    10.1      08 Oct 2023 20:33    TPro  8.2<H>  /  Alb  5.2  /  TBili  0.4  /  DBili  x   /  AST  23  /  ALT  28  /  AlkPhos  164<H>  10-08    LIVER FUNCTIONS - ( 08 Oct 2023 20:33 )  Alb: 5.2 g/dL / Pro: 8.2 g/dL / ALK PHOS: 164 U/L / ALT: 28 U/L / AST: 23 U/L / GGT: x           PT/INR - ( 08 Oct 2023 20:33 )   PT: 11.80 sec;   INR: 1.03 ratio         PTT - ( 08 Oct 2023 20:33 )  PTT:42.1 sec  Urinalysis Basic - ( 08 Oct 2023 20:33 )    Color: x / Appearance: x / SG: x / pH: x  Gluc: 148 mg/dL / Ketone: x  / Bili: x / Urobili: x   Blood: x / Protein: x / Nitrite: x   Leuk Esterase: x / RBC: x / WBC x   Sq Epi: x / Non Sq Epi: x / Bacteria: x  ++++++++++++++++++++++++++++++++++++++++++++++++++++++++++++++++++++++++++++++++++  < from: CT Head No Cont (10.08.23 @ 21:53) >    IMPRESSION:    No evidence for acute intracranial hemorrhage, territorial infarct or   midline shift.    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 Can consider MRI for further evaluation if warranted.    Moderate chronic microvascular ischemic change.    --- End of Report ---    CICI AMATO MD; Resident Radiologist  This document has been electronically signed.  SHARRI BROWN MD; Attending Radiologist  This document has been electronically signed. Oct  8 2023 11:05PM    < end of copied text >  ++++++++++++++++++++++++++++++++++++++++++++++++++++++++++++++++++++++++++++++++++  < from: CT Orbit No Cont (10.08.23 @ 21:53) >    Findings:    The globes are intact and lenses normally located. Extraocular muscles   and optic nerve sheath complex appear symmetric and unremarkable. No   evidence of infiltration of the retrobulbar fat.    Trace mucosal thickening in the frontal and ethmoid sinuses. Otherwise   the visualized paranasal sinuses and mastoids are clear.    No evidence for acute fracture or dislocation. Atherosclerotic   calcifications at the skull base.    Impression:  Unremarkable CT examination of the orbits.    --- End of Report ---      CICI AMATO MD; Resident Radiologist  This document has been electronically signed.  SHARRI BROWN MD; Attending Radiologist  This document has been electronically signed. Oct  8 2023 11:12PM    < end of copied text >    < from: CT Angio Head w/ IV Cont (10.08.23 @ 22:02) >    < end of copied text >

## 2023-10-09 NOTE — H&P ADULT - NSHPSOCIALHISTORY_GEN_ALL_CORE
Substance Use (street drugs): ( x ) never used  (  ) other:  Tobacco Usage:  ( x  ) never smoked   (   ) former smoker   (   ) current smoker  (     ) pack year  Alcohol Usage: None Substance Use (street drugs): (+) daily marijuana user  Tobacco Usage:  (+) current smoker, smokes 10 cigarettes a day.   Alcohol Usage: None

## 2023-10-09 NOTE — H&P ADULT - NSICDXPASTMEDICALHX_GEN_ALL_CORE_FT
PAST MEDICAL HISTORY:  CAD (coronary atherosclerotic disease)     DM (diabetes mellitus)     HLD (hyperlipidemia)     HTN, age 0-18     S/P CABG x 3

## 2023-10-09 NOTE — H&P ADULT - ASSESSMENT
Assessment:        plan:    #retinal detachment  Patient to be transferred [place] for further ophtho evaluation.         Assessment:  Patient is a 64 y/o M with a pmhx of DM, HTN, HLD, CAD s/p CABG/ stents who presents with right eye floaters associated with a headache.      Plan:    Admit to inpatient level of care-medicine   CHG ordered.  VTE: Plavix.  GI ppx: Not indicated.  Monitor labs and vital signs.      #Retinal detachment  Patient to be transferred [place] for further ophthalmology evaluation.    #HTN  Monitor vital signs.  Continue with lisinopril 40mg daily    #HLD  Continue with statin.    #DM  Monitor FSBS.    #CAD s/p CABG  Continue home medications..        Above discussed with Dr. Michael     Assessment:  Patient is a 64 y/o M with a pmhx of DM, HTN, HLD, CAD s/p CABG/ stents who presents with right eye floaters associated with a headache.      Plan:    Admit to inpatient level of care-medicine   CHG ordered.  VTE: Plavix.  GI ppx: Not indicated.  Monitor labs and vital signs.      #Retinal detachment  Patient to be transferred [place] for further ophthalmology evaluation..  discussed with opth    #HTN  Monitor vital signs.  Continue with lisinopril 40mg daily    #HLD  Continue with statin.    #DM  Monitor FSBS.    #CAD s/p CABG  Continue home medications..        Above discussed with Dr. Michael    #Progress Note Handoff  Pending (specify): as above    Family discussion:  plan of care was discussed with patient   in details.  all questions were answered.  seems to understand, and in agreement  Disposition: home   sign out given to [place] hospitalist, and MAR

## 2023-10-10 LAB
ALBUMIN SERPL ELPH-MCNC: 4.2 G/DL — SIGNIFICANT CHANGE UP (ref 3.5–5.2)
ALP SERPL-CCNC: 143 U/L — HIGH (ref 30–115)
ALT FLD-CCNC: 30 U/L — SIGNIFICANT CHANGE UP (ref 0–41)
ANION GAP SERPL CALC-SCNC: 14 MMOL/L — SIGNIFICANT CHANGE UP (ref 7–14)
AST SERPL-CCNC: 23 U/L — SIGNIFICANT CHANGE UP (ref 0–41)
BASOPHILS # BLD AUTO: 0.06 K/UL — SIGNIFICANT CHANGE UP (ref 0–0.2)
BASOPHILS NFR BLD AUTO: 0.8 % — SIGNIFICANT CHANGE UP (ref 0–1)
BILIRUB SERPL-MCNC: 0.6 MG/DL — SIGNIFICANT CHANGE UP (ref 0.2–1.2)
BUN SERPL-MCNC: 13 MG/DL — SIGNIFICANT CHANGE UP (ref 10–20)
CALCIUM SERPL-MCNC: 9.4 MG/DL — SIGNIFICANT CHANGE UP (ref 8.4–10.4)
CHLORIDE SERPL-SCNC: 104 MMOL/L — SIGNIFICANT CHANGE UP (ref 98–110)
CO2 SERPL-SCNC: 21 MMOL/L — SIGNIFICANT CHANGE UP (ref 17–32)
CREAT SERPL-MCNC: 0.8 MG/DL — SIGNIFICANT CHANGE UP (ref 0.7–1.5)
EGFR: 99 ML/MIN/1.73M2 — SIGNIFICANT CHANGE UP
EOSINOPHIL # BLD AUTO: 0.14 K/UL — SIGNIFICANT CHANGE UP (ref 0–0.7)
EOSINOPHIL NFR BLD AUTO: 1.8 % — SIGNIFICANT CHANGE UP (ref 0–8)
GLUCOSE BLDC GLUCOMTR-MCNC: 140 MG/DL — HIGH (ref 70–99)
GLUCOSE BLDC GLUCOMTR-MCNC: 155 MG/DL — HIGH (ref 70–99)
GLUCOSE BLDC GLUCOMTR-MCNC: 172 MG/DL — HIGH (ref 70–99)
GLUCOSE SERPL-MCNC: 143 MG/DL — HIGH (ref 70–99)
HCT VFR BLD CALC: 41.3 % — LOW (ref 42–52)
HCV AB S/CO SERPL IA: 160.1 COI — HIGH
HCV AB SERPL-IMP: REACTIVE
HGB BLD-MCNC: 14.1 G/DL — SIGNIFICANT CHANGE UP (ref 14–18)
IMM GRANULOCYTES NFR BLD AUTO: 0.4 % — HIGH (ref 0.1–0.3)
LYMPHOCYTES # BLD AUTO: 2.01 K/UL — SIGNIFICANT CHANGE UP (ref 1.2–3.4)
LYMPHOCYTES # BLD AUTO: 25.2 % — SIGNIFICANT CHANGE UP (ref 20.5–51.1)
MCHC RBC-ENTMCNC: 30.1 PG — SIGNIFICANT CHANGE UP (ref 27–31)
MCHC RBC-ENTMCNC: 34.1 G/DL — SIGNIFICANT CHANGE UP (ref 32–37)
MCV RBC AUTO: 88.1 FL — SIGNIFICANT CHANGE UP (ref 80–94)
MONOCYTES # BLD AUTO: 0.49 K/UL — SIGNIFICANT CHANGE UP (ref 0.1–0.6)
MONOCYTES NFR BLD AUTO: 6.1 % — SIGNIFICANT CHANGE UP (ref 1.7–9.3)
NEUTROPHILS # BLD AUTO: 5.26 K/UL — SIGNIFICANT CHANGE UP (ref 1.4–6.5)
NEUTROPHILS NFR BLD AUTO: 65.7 % — SIGNIFICANT CHANGE UP (ref 42.2–75.2)
NRBC # BLD: 0 /100 WBCS — SIGNIFICANT CHANGE UP (ref 0–0)
PLATELET # BLD AUTO: 231 K/UL — SIGNIFICANT CHANGE UP (ref 130–400)
PMV BLD: 9.9 FL — SIGNIFICANT CHANGE UP (ref 7.4–10.4)
POTASSIUM SERPL-MCNC: 4.2 MMOL/L — SIGNIFICANT CHANGE UP (ref 3.5–5)
POTASSIUM SERPL-SCNC: 4.2 MMOL/L — SIGNIFICANT CHANGE UP (ref 3.5–5)
PROT SERPL-MCNC: 6.8 G/DL — SIGNIFICANT CHANGE UP (ref 6–8)
RBC # BLD: 4.69 M/UL — LOW (ref 4.7–6.1)
RBC # FLD: 12.4 % — SIGNIFICANT CHANGE UP (ref 11.5–14.5)
SODIUM SERPL-SCNC: 139 MMOL/L — SIGNIFICANT CHANGE UP (ref 135–146)
WBC # BLD: 7.99 K/UL — SIGNIFICANT CHANGE UP (ref 4.8–10.8)
WBC # FLD AUTO: 7.99 K/UL — SIGNIFICANT CHANGE UP (ref 4.8–10.8)

## 2023-10-10 PROCEDURE — 99233 SBSQ HOSP IP/OBS HIGH 50: CPT

## 2023-10-10 PROCEDURE — 71045 X-RAY EXAM CHEST 1 VIEW: CPT | Mod: 26

## 2023-10-10 RX ORDER — LISINOPRIL 2.5 MG/1
20 TABLET ORAL ONCE
Refills: 0 | Status: COMPLETED | OUTPATIENT
Start: 2023-10-10 | End: 2023-10-10

## 2023-10-10 RX ORDER — TICAGRELOR 90 MG/1
90 TABLET ORAL ONCE
Refills: 0 | Status: COMPLETED | OUTPATIENT
Start: 2023-10-10 | End: 2023-10-10

## 2023-10-10 RX ORDER — DIAZEPAM 5 MG
2 TABLET ORAL ONCE
Refills: 0 | Status: DISCONTINUED | OUTPATIENT
Start: 2023-10-10 | End: 2023-10-10

## 2023-10-10 RX ORDER — LISINOPRIL 2.5 MG/1
40 TABLET ORAL DAILY
Refills: 0 | Status: ACTIVE | OUTPATIENT
Start: 2023-10-11 | End: 2024-09-07

## 2023-10-10 RX ORDER — NIFEDIPINE 30 MG
30 TABLET, EXTENDED RELEASE 24 HR ORAL DAILY
Refills: 0 | Status: ACTIVE | OUTPATIENT
Start: 2023-10-10 | End: 2024-09-07

## 2023-10-10 RX ORDER — HEPARIN SODIUM 5000 [USP'U]/ML
5000 INJECTION INTRAVENOUS; SUBCUTANEOUS EVERY 12 HOURS
Refills: 0 | Status: DISCONTINUED | OUTPATIENT
Start: 2023-10-10 | End: 2023-10-11

## 2023-10-10 RX ORDER — CLOPIDOGREL BISULFATE 75 MG/1
75 TABLET, FILM COATED ORAL DAILY
Refills: 0 | Status: ACTIVE | OUTPATIENT
Start: 2023-10-11 | End: 2024-09-08

## 2023-10-10 RX ADMIN — Medication 1: at 12:19

## 2023-10-10 RX ADMIN — TICAGRELOR 90 MILLIGRAM(S): 90 TABLET ORAL at 21:42

## 2023-10-10 RX ADMIN — LISINOPRIL 20 MILLIGRAM(S): 2.5 TABLET ORAL at 17:01

## 2023-10-10 RX ADMIN — Medication 30 MILLIGRAM(S): at 21:09

## 2023-10-10 RX ADMIN — HEPARIN SODIUM 5000 UNIT(S): 5000 INJECTION INTRAVENOUS; SUBCUTANEOUS at 06:13

## 2023-10-10 RX ADMIN — LISINOPRIL 20 MILLIGRAM(S): 2.5 TABLET ORAL at 06:13

## 2023-10-10 RX ADMIN — PANTOPRAZOLE SODIUM 40 MILLIGRAM(S): 20 TABLET, DELAYED RELEASE ORAL at 06:14

## 2023-10-10 RX ADMIN — TICAGRELOR 90 MILLIGRAM(S): 90 TABLET ORAL at 06:13

## 2023-10-10 RX ADMIN — Medication 81 MILLIGRAM(S): at 12:19

## 2023-10-10 RX ADMIN — Medication 25 MILLIGRAM(S): at 06:13

## 2023-10-10 RX ADMIN — Medication 1: at 17:26

## 2023-10-10 RX ADMIN — Medication 2 MILLIGRAM(S): at 21:42

## 2023-10-10 NOTE — CONSULT NOTE ADULT - SUBJECTIVE AND OBJECTIVE BOX
Neuroendovascular Consult note:   Consulted for: High grade stenosis v occlusion right VA     HPI:  Patient is a 64 y/o M with a pmhx of DM, HTN, HLD, CAD s/p CABG/ stents who presents with right eye floaters associated with a headache. Patient states similar episode occurred 2 months ago and subsided on its own. However, 1 week ago symptoms re-occurred and has been progressively since. Describes it as a "covering to the eye" and only able to see a green light from the upper part of the visual field. No visual changes to the left eye. Denies trauma,  fever, chills, nausea, vomiting, abdominal pain, diarrhea, dizziness, weakness, chest pain, SOB, back pain, LOC, trauma, urinary symptoms, cough, calf pain/swelling, recent travel, recent surgery. (09 Oct 2023 01:28)    Neuroendovascular team consulted for evaluation given CTA findings of right cervical vertebral artery short segmental V2 enhancement and reconstitution in the V4 segment - likely reflecting high grade stenosis / occlusion. Patient without complaints this AM. Still with partial vision loss right eye. States that two weeks ago he experienced floaters in right eye, with one episode of fall around the same time with bl UE weakness, no focal deficits. Patient states that he again experienced right eye floaters which progressed to the sensation of "shade over the eye" with partial visual loss of the right eye with intact upper outer quadrant vision. Patient denies acute headache, nausea, vomiting, unilateral weakness, sensory loss, head trauma. Patient states he has been on DAPT  and statin for a while since the coronary stents and CABG. Active smoker.     Past medical history:   HTN, age 0-18    DM (diabetes mellitus)    HLD (hyperlipidemia)    CAD (coronary atherosclerotic disease)    S/P CABG x 3        Medications:  aspirin enteric coated 81 milliGRAM(s) Oral daily  atorvastatin 40 milliGRAM(s) Oral at bedtime  clopidogrel Tablet 75 milliGRAM(s) Oral daily  dextrose 5%. 1000 milliLiter(s) IV Continuous <Continuous>  dextrose 50% Injectable 25 Gram(s) IV Push once  glucagon  Injectable 1 milliGRAM(s) IntraMuscular once  heparin   Injectable 5000 Unit(s) SubCutaneous every 12 hours  influenza   Vaccine 0.5 milliLiter(s) IntraMuscular once  insulin lispro (ADMELOG) corrective regimen sliding scale   SubCutaneous three times a day before meals  lisinopril 20 milliGRAM(s) Oral daily  metoprolol succinate ER 25 milliGRAM(s) Oral daily  pantoprazole    Tablet 40 milliGRAM(s) Oral before breakfast      Vitals:   T(F): 97.9 (10-09-23 @ 14:13), Max: 97.9 (10-08-23 @ 20:02)  HR: 61 (10-09-23 @ 14:13) (47 - 74)  BP: 171/91 (10-09-23 @ 14:13) (149/72 - 220/139)  RR: 18 (10-09-23 @ 14:13) (17 - 18)  SpO2: 96% (10-09-23 @ 14:13) (96% - 99%)    Labs:                         14.3   7.54  )-----------( 256      ( 09 Oct 2023 11:22 )             43.0     10-09    139  |  99  |  13  ----------------------------<  184<H>  4.0   |  27  |  0.8    Ca    9.7      09 Oct 2023 11:22  Mg     2.2     10-09    TPro  8.2<H>  /  Alb  5.2  /  TBili  0.4  /  DBili  x   /  AST  23  /  ALT  28  /  AlkPhos  164<H>  10-08    PT/INR - ( 08 Oct 2023 20:33 )   PT: 11.80 sec;   INR: 1.03 ratio         PTT - ( 08 Oct 2023 20:33 )  PTT:42.1 sec  LIVER FUNCTIONS - ( 08 Oct 2023 20:33 )  Alb: 5.2 g/dL / Pro: 8.2 g/dL / ALK PHOS: 164 U/L / ALT: 28 U/L / AST: 23 U/L / GGT: x             Exam:   General - NAD   Neuro - AAO3, follows commands, EOMi, right eye with partial vision loss (superior outer quadrant intact), face symmetric, moves all extremities to antigravity, sensation intact, no neglect, no aphasia, no dysarthria, no dysmetria on finger to nose     Radiology:   < from: CT Angio Neck w/ IV Cont (10.08.23 @ 22:03) >    IMPRESSION:  Loss of vascular enhancement in the right cervical vertebral artery with   short segmental V2 enhancement and reconstitution in the V4 segment   likely reflecting high-grade stenosis/occlusion.    Scattered atherosclerotic plaques in bilateral common and internal   carotid arteries resulting up to mild stenosis in proximal bilateral ICAs   (less than 50%).    Attending note:  These findings and the change from the preliminary interpretation have   been communicated by Dr. Luna to the Dr. Britton on 10/9/2023 at 9:00 AM.    < end of copied text >  < from: CT Head No Cont (10.08.23 @ 21:53) >  IMPRESSION:    No evidence for acute intracranial hemorrhage, territorial infarct or   midline shift.    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 Can consider MRI for further evaluation if warranted.    Moderate chronic microvascular ischemic change.      < end of copied text >  < from: CT Orbit No Cont (10.08.23 @ 21:53) >    Impression:  Unremarkable CT examination of the orbits.    < end of copied text >    Assessment:   Patient is a 63 year old male, history of HTN, DM, HLD, CAD s/p CABG/stents presenting with two episodes of right eye floaters, now with progressive vision loss of the right eye. Patient found with right eye retinal detachment, ophthalmology following. CTH reporting punctate left cerebellar infarct (likely chronic), CTA reporting right vertebral artery high grade stenosis v occlusion. Neuroendovascular team consulted for the above findings.     Suggestions:  No acute neuroendovascular intervention at this time.   Recommending MRI brain if compatible to better quantify left cerebellar infarct seen on CTH.   PRU to assess plavix response levels   Continue DAPT - aspirin and plavix while pending PRU sample   Continue statin   Discussed with Dr Mccoy. Updated medical team.   [id number] 
63 year old male pmhx dm2 htn hld cad sp cabg, no ocular hx, presents with 1 week of vision loss od, states 1 week ago he had flashes of light, floaters, and flashes of colors in the right eye. similar episode occured last year but he did not see treatment. pt states that he has had 5 days of blackness of the vision, decided to come to the hospital becasue symptoms would not improve.     vision 20/20 OS OD bare LP, superotemporal vision 20/400 at very edge of visual field.   + grade 1 apd  OD  pressure 14/14, EOM full OU, cvf full OS    anterior exam preformed with 20D lens,   OD floppy lids, iris roudn but minimally reactive   OS floppy lids, otherwise wnl       DFE: os wnl c/d .5  OD: superior retinal detachment billowing over optic nerve and macula unable to see optic nerve or macula, no evidence of diabetic retinopathy, retinal break not identified.     B scan, retinal detachment with detachment of macula OD     
Reason For Visit: New Referral -  HPI: CC: Here today for an emergency appointment. Currently admitted for Vision loss OD, . Severity: severe.  Quality: blurry complete black out of vision . Duration of Problem: about 1 week. Associated Symptoms:  started with floaters OD. Other: has been worsening. Has a small view superal temporal . HPI obtained by  Dennis Conrad MD  Specialty Meds (Initial): None. Clopidogrel Bisulfate 75 mg.  PSFH/ROS Updated Date: 10/10/23  Medical Hx: Influenza Immunization Not Received (Onset: ). Influenza Immunization Received (Onset:  ). Diabetes Type II with Ocular Complications (Onset: ). Hyperlipidemia. Hypertension, Systemic.  Ischemic Heart Disease, Chronic. Arthritis. Surgical Hx: Heart Bypass (triple). Heart Stent. Shoulder Surgery.  Knee Surgery.  Systemic Meds: ASA 81 MG Enteric Coated Tablet. Atorvastatin Calcium, 40 mg oral tablet. Ezetimibe.  Lisinopril, 20 mg oral tablet. metformin hydrochloride 1 GM Oral Tablet. metoprolol succinate 25 MG 24 HR  Extended Release Tablet. Onglyza 5 MG Oral Tablet.  Allergies: NKDA.  Family Hx: Thyroid Disease. Retinal Detachment (Sister). Heart Disease (Father). Cancer (Mother). Social Hx:  Marital Status: . Smoking/Tobacco: Current Every Day Smoker 1 pack EOD. Alcohol: None.  Substance Abuse: None. Occupation: Disabled.  ROS: Ocular: See HPI. Other: Total of 10+ Systems Reviewed - All Others Noncontributory. Cardiovascular:  Negative. Constitutional: Negative. Endocrine: Negative. Gastrointestinal: Negative. Genitourinary: Negative.  Hematology/Oncology: Negative. HENT: Negative. Integumentary: Negative. Musculoskeletal: Negative.  Neurologic: Negative. Respiratory: Negative.  VA OD: DscLP. hm, eccentrically OS: Dsc20/25+2. PH20/20.  IOP: TP OD: 10 OS: 14 10:13 AM  Dilation:  Location: OU. Tech: mm. Time: 10:13 AM. Drops: Tropicamide 1%; Phenylephrine 2.5%.  Page 1 of 5  Patient: Clifton Parrish ( 1960)  Tuesday, October 10, 2023  External Right Eye Left Eye  • General  • Pupils Round. Brisk. 2+ RAPD. Round. Brisk.  • Motility Full. Orthotropic. Full. Orthotropic.  • CVF Unable. Full.  • Adnexa Normal Ocular Adnexa. Normal Ocular Adnexa.  Anterior Right Eye Left Eye  • General  • L/C/S White and Quiet. White and Quiet.  • Cornea Clear Epithelium. Clear Stroma. Clear  Endothelium.  Clear Epithelium. Clear Stroma. Clear  Endothelium.  • Anterior Chamber Normal Depth. Quiet. Normal Depth. Quiet.  • Iris Within Normal Limits. Within Normal Limits.  • Lens 2+ NS. 2+ NS.  Posterior Right Eye Left Eye  • General Lens: Indirect. Lens: 3-Mirror. Lens: 90D. Lens: Indirect. Lens: 3-Mirror. Lens: 90D.  • Nerve No Disc Edema. No Disc Pallor. CDR 0.5. No Disc Edema. No Disc Pallor. CDR 0.5.  • Vitreous Posterior Vitreous Detachment. Posterior Vitreous Detachment.  • Retinal Vessels AV Nicking. AV Nicking.  • Macula mac off rd. No Hemorrhage. No Subretinal Fluid. No  Edema.  • Periphery Multiple Retinal Tears. Total Retinal  Detachment. Lattice Degeneration.  No Holes or Tears. Attached. Lattice  Degeneration (Inferior).  OCT Macula: Findings OD: Retinal Detachment. Findings OS: Normal Retina Crossection with Preservation of  Fovea, Clivus and Cellular Lamina.  B-Scan: Findings OD: Total Retinal Detachment with PVR. Echographic Findings Consistent with a Posterior  Vitreous Detachment. Findings OS: No Echographic Findings Consistent with a Retinal Detachment or Mass  in Four Quadrants with Longitudinal and Transverse Views. Echographic Findings Consistent with a Posterior  Vitreous Detachment.  Imp/Plan:  1. Retinal Detachment with Multiple Breaks OD.  Page 2 of 5  Patient: Clifton Parrish ( 1960)  Tuesday, October 10, 2023  2. Lattice Degeneration of Retina OU.  3. Nuclear Sclerosis OU.  4. Posterior Vitreous Detachment (PVD) OU.  5. Glaucoma Suspect, Low Risk OU.  6. Retinal Vascular Attenuation OU.

## 2023-10-10 NOTE — CONSULT NOTE ADULT - ASSESSMENT
63 M hx floaters for 2 weeks and decrease in vision for 1 week. Thought it might  go away and when didn't went to ER. With near total retinal detachment OD with large temporal tear, inferior  tear, lattice, and a superior nasal tear. Not a pneumatic candidate and will add a scleral buckle for the inferior  breaks. Will Plan SB PPV MP EL c3f8 gas 14% OD. RBA discussed with patient including but not limited to  bleeding scarring infection inflammation need for additional surgeries need for glasses or loss of vision. Extra  time spent discussing gas no flying oil needs second surgery and will develop a cataract and 10% chance of re  detachment.    - NPO after midnight and surgery will be at [facility] 10/11/23. Will need some left side down or face down positioning after surgery
superior mac off retinal detachment    1. lay flat with no pillows for as long as tolerated, walk occasionally to ensure no DVT  2. follow up in ophthalmology clinic tomorrow morning.   3. from an ophthalmology standpoint the patient is OK for discharge, barring any other medical issues that require hospitalization. defer to primary team for final dispo  4. pt states understanding of poor visual prognosis, agrees to follow up tomorrow morning  5. monocular vision precautions given.   6. retinal detachment return precautions for the left eye given.     Discussed with Dr. Conrad

## 2023-10-11 ENCOUNTER — TRANSCRIPTION ENCOUNTER (OUTPATIENT)
Age: 63
End: 2023-10-11

## 2023-10-11 ENCOUNTER — NON-APPOINTMENT (OUTPATIENT)
Age: 63
End: 2023-10-11

## 2023-10-11 VITALS — WEIGHT: 179.9 LBS | HEIGHT: 72 IN

## 2023-10-11 LAB
GLUCOSE BLDC GLUCOMTR-MCNC: 143 MG/DL — HIGH (ref 70–99)
GLUCOSE BLDC GLUCOMTR-MCNC: 163 MG/DL — HIGH (ref 70–99)
GLUCOSE BLDC GLUCOMTR-MCNC: 176 MG/DL — HIGH (ref 70–99)
HCV RNA FLD QL NAA+PROBE: SIGNIFICANT CHANGE UP
HCV RNA SPEC NAA+PROBE-LOG IU: SIGNIFICANT CHANGE UP IU/ML
HCV RNA SPEC NAA+PROBE-LOG IU: SIGNIFICANT CHANGE UP LOGIU/ML
HCV RNA SPEC QL PROBE+SIG AMP: SIGNIFICANT CHANGE UP

## 2023-10-11 PROCEDURE — 93010 ELECTROCARDIOGRAM REPORT: CPT

## 2023-10-11 PROCEDURE — 99239 HOSP IP/OBS DSCHRG MGMT >30: CPT

## 2023-10-11 RX ORDER — NIFEDIPINE 30 MG
1 TABLET, EXTENDED RELEASE 24 HR ORAL
Qty: 0 | Refills: 0
Start: 2023-10-11

## 2023-10-11 RX ORDER — NIFEDIPINE 30 MG
1 TABLET, EXTENDED RELEASE 24 HR ORAL
Qty: 30 | Refills: 3
Start: 2023-10-11

## 2023-10-11 RX ORDER — METOPROLOL TARTRATE 50 MG
1 TABLET ORAL
Qty: 0 | Refills: 0 | DISCHARGE
Start: 2023-10-11

## 2023-10-11 RX ORDER — HYDROXYZINE HCL 10 MG
25 TABLET ORAL ONCE
Refills: 0 | Status: COMPLETED | OUTPATIENT
Start: 2023-10-11 | End: 2023-10-11

## 2023-10-11 RX ORDER — LISINOPRIL 2.5 MG/1
1 TABLET ORAL
Refills: 0 | DISCHARGE

## 2023-10-11 RX ORDER — METFORMIN HYDROCHLORIDE 850 MG/1
1 TABLET ORAL
Refills: 0 | DISCHARGE

## 2023-10-11 RX ORDER — ASPIRIN/CALCIUM CARB/MAGNESIUM 324 MG
1 TABLET ORAL
Refills: 0 | DISCHARGE

## 2023-10-11 RX ORDER — METOPROLOL TARTRATE 50 MG
1 TABLET ORAL
Refills: 0 | DISCHARGE

## 2023-10-11 RX ORDER — CLOPIDOGREL BISULFATE 75 MG/1
1 TABLET, FILM COATED ORAL
Refills: 0 | DISCHARGE

## 2023-10-11 RX ORDER — LISINOPRIL 2.5 MG/1
1 TABLET ORAL
Qty: 30 | Refills: 0
Start: 2023-10-11

## 2023-10-11 RX ORDER — CLOPIDOGREL BISULFATE 75 MG/1
1 TABLET, FILM COATED ORAL
Qty: 30 | Refills: 0
Start: 2023-10-11

## 2023-10-11 RX ORDER — ONDANSETRON 8 MG/1
4 TABLET, FILM COATED ORAL ONCE
Refills: 0 | Status: DISCONTINUED | OUTPATIENT
Start: 2023-10-11 | End: 2023-10-11

## 2023-10-11 RX ORDER — HYDROMORPHONE HYDROCHLORIDE 2 MG/ML
0.5 INJECTION INTRAMUSCULAR; INTRAVENOUS; SUBCUTANEOUS
Refills: 0 | Status: DISCONTINUED | OUTPATIENT
Start: 2023-10-11 | End: 2023-10-11

## 2023-10-11 RX ORDER — ASPIRIN/CALCIUM CARB/MAGNESIUM 324 MG
1 TABLET ORAL
Qty: 30 | Refills: 3
Start: 2023-10-11

## 2023-10-11 RX ORDER — SODIUM CHLORIDE 9 MG/ML
1000 INJECTION, SOLUTION INTRAVENOUS
Refills: 0 | Status: DISCONTINUED | OUTPATIENT
Start: 2023-10-11 | End: 2023-10-11

## 2023-10-11 RX ORDER — METFORMIN HYDROCHLORIDE 850 MG/1
1 TABLET ORAL
Qty: 0 | Refills: 0 | DISCHARGE
Start: 2023-10-11

## 2023-10-11 RX ADMIN — Medication 25 MILLIGRAM(S): at 12:49

## 2023-10-11 RX ADMIN — SODIUM CHLORIDE 75 MILLILITER(S): 9 INJECTION, SOLUTION INTRAVENOUS at 18:02

## 2023-10-11 RX ADMIN — PANTOPRAZOLE SODIUM 40 MILLIGRAM(S): 20 TABLET, DELAYED RELEASE ORAL at 05:04

## 2023-10-11 RX ADMIN — Medication 25 MILLIGRAM(S): at 05:03

## 2023-10-11 RX ADMIN — Medication 81 MILLIGRAM(S): at 11:22

## 2023-10-11 RX ADMIN — CLOPIDOGREL BISULFATE 75 MILLIGRAM(S): 75 TABLET, FILM COATED ORAL at 11:22

## 2023-10-11 RX ADMIN — LISINOPRIL 40 MILLIGRAM(S): 2.5 TABLET ORAL at 05:03

## 2023-10-11 NOTE — DISCHARGE NOTE NURSING/CASE MANAGEMENT/SOCIAL WORK - NSDCPETBCESMAN_GEN_ALL_CORE
FYI 
If you are a smoker, it is important for your health to stop smoking. Please be aware that second hand smoke is also harmful.

## 2023-10-11 NOTE — CHART NOTE - NSCHARTNOTEFT_GEN_A_CORE
P2Y12/PRU lab drawn at 3:24   Run at 3:35   Result: 202   Lot # [id number]    Patient is subtherapeutic to plavix based on above response levels.   At this time recommending d/c plavix and starting Brilinta (ticagrelor) 90 mg BID to ensure appropriate antiplatelet response.     Primary team notified.   [id number]
PACU ANESTHESIA ADMISSION NOTE      Procedure:  sclerobuckling, vitrectomy  Post op diagnosis:  retina detachment    ____  Intubated  TV:______       Rate: ______      FiO2: ______    _x___  Patent Airway    _x___  Full return of protective reflexes    _x___  Full recovery from anesthesia / back to baseline status    Vitals:  see anesthesia record    Mental Status:  _x___ Awake   _____ Alert   _____ Drowsy   _____ Sedated    Nausea/Vomiting:  _x___  NO       ______Yes,   See Post - Op Orders         Pain Scale (0-10):  _____    Treatment: ____ None    __x__ See Post - Op/PCA Orders    Post - Operative Fluids:   ____ Oral   ___x_ See Post - Op Orders    Plan: Discharge:   _x___Home       _____Floor     _____Critical Care    _____  Other:_________________    Comments:  No anesthesia issues or complications noted.  Discharge when criteria met.
Patient reportedly admitted to noncompliance with home Plavix to medicine team, likely contributing to subtherapeutic P2Y12 response levels obtained 10/9.   Per Dr Rosas, switched Brilinta back to home Plavix dose.   To follow up in outpatient neuroendovascular clinic with possible repeat PRU at appointment time.     [id number]

## 2023-10-11 NOTE — DISCHARGE NOTE PROVIDER - HOSPITAL COURSE
Patient is a 64 y/o M with a pmhx of DM, HTN, HLD, CAD s/p CABG/ stents who presents with right eye floaters associated with a headache. Patient states similar episode occurred 2 months ago and subsided on its own. However, 1 week ago symptoms re-occurred and has been progressively since. Describes it as a "covering to the eye".       MRI brain showed :  Moderate chronic microvascular ischemic changes and small bilateral   cerebellar chronic infarcts.    CT angio  neck showed :  Loss of vascular enhancement in the right cervical vertebral artery with   short segmental V2 enhancement and reconstitution in the V4 segment   likely reflecting high-grade stenosis/occlusion.    Scattered atherosclerotic plaques in bilateral common and internal   carotid arteries resulting up to mild stenosis in proximal bilateral ICAs   (less than 50%).  Seen by neurology patient on DAPT and statins     Patient seen by ophtalmology found to have superior mac off retinal detachment , patient had a surgery at the [facility]  on 10/11

## 2023-10-11 NOTE — PROGRESS NOTE ADULT - ASSESSMENT
Admit to inpatient level of care-medicine   CHG ordered.  VTE: Plavix.  GI ppx: Not indicated.  Monitor labs and vital signs.      #Retinal detachment  [place] to be [place] for further ophthalmology evaluation..  discussed with opth    #HTN  Monitor vital signs.  Continue with lisinopril 40mg daily    #HLD  Continue with statin.    #DM  Monitor FSBS.    #CAD s/p CABG  Continue home medications..  
Patient is a 62 y/o M with a pmhx of DM, HTN, HLD, CAD s/p CABG/ stents who presents with right eye floaters associated with a headache.    #Retinal detachment  - opthalmo consulted  - plan for OR on 10/11   - pt is intermed risk for low risk surgery  - no recent H/o CP, SOB, unlimited exercise tolerance, >>>>4METS   - EKG w/ out acute ischemic changes, CXR NAPD (my read)    # High grade stenosis in Rt VA  - CT angio chest: Loss of vascular enhancement in the right cervical vertebral artery with   short segmental V2 enhancement and reconstitution in the V4 segment   likely reflecting high-grade stenosis/occlusion.  - no intervention as per NI  - pt reports not taking Plavix for weeks therefore PRU is not diagnostic  - will switch pt back to Plavix  - check LDL and if high cont HD statin  - outpt PRU w/ neuro    #Uncontrolled HTN  Monitor vital signs.  Continue with lisinopril 40mg daily and added Procardia 30    #HLD  Continue with statin.  check LDL    #DM  Monitor FSBS.    #CAD s/p CABG  Continue home medications..    Ophtho notified us that they might clear the pt tonight and he might be able to go home tonight.    Discharge instructions discussed and patient knows when to seek immediate medical attention.  Patient has proper follow up.  All results discussed and patient aware they require further follow up/work up.  Stressed importance of proper follow up.  Medications prescribed and changes discussed.  All questions and concerns from patient and family addressed. Understanding of instructions verbalized.    Dispo: d/c home post OR if cleared by ophtho and anesthesia.    My note supersedes the residents note should a discrepancy arise.    Chart and notes personally reviewed.  Care Discussed with Consultants/Other Providers/ Housestaff [ x] YES [ ] NO   Radiology, labs, old records personally reviewed.    discussed w/ housestaff, nursing, case management    Attestation Statements:    Attestation Statements:  Risk Statement (NON-critical care).     On this date of service, level of risk to patient is considered: High.     Due to: vision loss, severe vertebral stenosis, preop optimization    Time-based billing (NON-critical care).     50 minutes spent on total encounter. The necessity of the time spent during the encounter on this date of service was due to:     time spent on review of labs, imaging studies, old records, obtaining history, personally examining patient, counselling and communicating with patient/ family, entering orders for medications/tests/etc, discussions with other health care providers, documentation in electronic health records, independent interpretation of labs, imaging/procedure results and care coordination.    
Patient is a 62 y/o M with a pmhx of DM, HTN, HLD, CAD s/p CABG/ stents who presents with right eye floaters associated with a headache.    #Retinal detachment  - opthalmo consulted  - plan for OR on 10/11   - pt is intermed risk for low risk surgery  - no recent H/o CP, SOB, unlimited exercise tolerance, >>>>4METS  - EKG w/ out acute ischemic changes, CXR NAPD (my read)    # High grade stenosis in Rt VA  - CT angio chest: Loss of vascular enhancement in the right cervical vertebral artery with   short segmental V2 enhancement and reconstitution in the V4 segment   likely reflecting high-grade stenosis/occlusion.  - no intervention as per NI  - pt reports not taking Plavix for weeks therefore PRU is not diagnostic  - will switch pt back to Plavix  - check LDL and if high cont HD statin  - outpt PRU w/ neuro    #Uncontrolled HTN  Monitor vital signs.  Continue with lisinopril 40mg daily and added Procardia 30    #HLD  Continue with statin.  check LDL    #DM  Monitor FSBS.    #CAD s/p CABG  Continue home medications..    #Progress Note Handoff  Pending: Clinical improvement and stability__x___OR 10/11_  Pt/Family discussion: Pt informed and agrees with the current plan  Disposition: Home_    My note supersedes the residents note should a discrepancy arise.    Chart and notes personally reviewed.  Care Discussed with Consultants/Other Providers/ Housestaff [ x] YES [ ] NO   Radiology, labs, old records personally reviewed.    discussed w/ housestaff, nursing, case management    Attestation Statements:    Attestation Statements:  Risk Statement (NON-critical care).     On this date of service, level of risk to patient is considered: High.     Due to: vision loss, severe vertebral stenosis, preop optimization    Time-based billing (NON-critical care).     50 minutes spent on total encounter. The necessity of the time spent during the encounter on this date of service was due to:     time spent on review of labs, imaging studies, old records, obtaining history, personally examining patient, counselling and communicating with patient/ family, entering orders for medications/tests/etc, discussions with other health care providers, documentation in electronic health records, independent interpretation of labs, imaging/procedure results and care coordination.

## 2023-10-11 NOTE — DISCHARGE NOTE PROVIDER - NSDCMRMEDTOKEN_GEN_ALL_CORE_FT
aspirin 81 mg oral tablet: 1 tab(s) orally  Lipitor 40 mg oral tablet: 1 tab(s) orally once a day  lisinopril 20 mg oral tablet: 1 tab(s) orally once a day  metFORMIN 500 mg oral tablet: 1 tab(s) orally 2 times a day  metoprolol succinate 25 mg oral tablet, extended release: 1 tab(s) orally once a day  Plavix 75 mg oral tablet: 1 tab(s) orally once a day   aspirin 81 mg oral tablet: 1 tab(s) orally  Lipitor 40 mg oral tablet: 1 tab(s) orally once a day  lisinopril 20 mg oral tablet: 1 tab(s) orally once a day  metoprolol succinate 25 mg oral tablet, extended release: 1 tab(s) orally once a day  Plavix 75 mg oral tablet: 1 tab(s) orally once a day   aspirin 81 mg oral tablet: 1 tab(s) orally once a day  Lipitor 40 mg oral tablet: 1 tab(s) orally once a day  lisinopril 20 mg oral tablet: 1 tab(s) orally once a day  metFORMIN 500 mg oral tablet: 1 tab(s) orally 2 times a day  metoprolol succinate 25 mg oral tablet, extended release: 1 tab(s) orally once a day  NIFEdipine 30 mg oral tablet, extended release: 1 tab(s) orally once a day  Plavix 75 mg oral tablet: 1 tab(s) orally once a day

## 2023-10-11 NOTE — DISCHARGE NOTE NURSING/CASE MANAGEMENT/SOCIAL WORK - PATIENT PORTAL LINK FT
You can access the FollowMyHealth Patient Portal offered by [facility] by registering at the following website: [email or web address]. By joining [facility]’s FollowMyHealth portal, you will also be able to view your health information using other applications (apps) compatible with our system.

## 2023-10-11 NOTE — DISCHARGE NOTE PROVIDER - NSDCACTIVITY_GEN_ALL_CORE
Do not drive or operate machinery Do not drive or operate machinery/No heavy lifting/straining/Follow Instructions Provided by your Surgical Team

## 2023-10-11 NOTE — PROGRESS NOTE ADULT - SUBJECTIVE AND OBJECTIVE BOX
Neuroendovascular Progress Note:     HPI:  Patient is a 63 year old male, history of HTN, DM, HLD, CAD s/p CABG/stents presenting with two episodes of right eye floaters, now with progressive vision loss of the right eye. Patient found with right eye retinal detachment, ophthalmology following. CTH reporting punctate left cerebellar infarct (likely chronic), CTA reporting right vertebral artery high grade stenosis v occlusion. Neuroendovascular team consulted for the above findings. MRI brain negative. Planning for opthalmology procedure tomorrow.     Past medical history:   HTN, age 0-18  DM (diabetes mellitus)  HLD (hyperlipidemia)  CAD (coronary atherosclerotic disease)  S/P CABG x 3    Medications:   aspirin enteric coated 81 milliGRAM(s) Oral daily  atorvastatin 40 milliGRAM(s) Oral at bedtime  dextrose 5%. 1000 milliLiter(s) IV Continuous <Continuous>  dextrose 50% Injectable 25 Gram(s) IV Push once  glucagon  Injectable 1 milliGRAM(s) IntraMuscular once  heparin   Injectable 5000 Unit(s) SubCutaneous every 12 hours  influenza   Vaccine 0.5 milliLiter(s) IntraMuscular once  insulin lispro (ADMELOG) corrective regimen sliding scale   SubCutaneous three times a day before meals  lisinopril 20 milliGRAM(s) Oral daily  metoprolol succinate ER 25 milliGRAM(s) Oral daily  pantoprazole    Tablet 40 milliGRAM(s) Oral before breakfast  ticagrelor 90 milliGRAM(s) Oral two times a day    Vitals:   T(F): 97.2 (10-10-23 @ 13:25), Max: 98.2 (10-09-23 @ 21:00)  HR: 61 (10-10-23 @ 13:25) (53 - 61)  BP: 176/94 (10-10-23 @ 13:25) (140/80 - 176/94)  RR: 19 (10-10-23 @ 13:25) (18 - 19)  SpO2: 98% (10-10-23 @ 13:25) (96% - 98%)    Labs:                         14.1   7.99  )-----------( 231      ( 10 Oct 2023 06:50 )             41.3     10-10    139  |  104  |  13  ----------------------------<  143<H>  4.2   |  21  |  0.8    Ca    9.4      10 Oct 2023 06:50  Mg     2.2     10-09  TPro  6.8  /  Alb  4.2  /  TBili  0.6  /  DBili  x   /  AST  23  /  ALT  30  /  AlkPhos  143<H>  10-10  PT/INR - ( 08 Oct 2023 20:33 )   PT: 11.80 sec;   INR: 1.03 ratio    PTT - ( 08 Oct 2023 20:33 )  PTT:42.1 sec  LIVER FUNCTIONS - ( 10 Oct 2023 06:50 )  Alb: 4.2 g/dL / Pro: 6.8 g/dL / ALK PHOS: 143 U/L / ALT: 30 U/L / AST: 23 U/L / GGT: x           Exam:   General - NAD   Neuro - AAO3, follows commands, EOMi, right eye with partial vision loss (superior outer quadrant intact), face symmetric, moves all extremities to antigravity, sensation intact, no neglect, no aphasia, no dysarthria, no dysmetria on finger to nose     Radiology:   Imaging reviewed per neuroendovascular ACP/attending.     < from: CT Angio Neck w/ IV Cont (10.08.23 @ 22:03) >  IMPRESSION:  Loss of vascular enhancement in the right cervical vertebral artery with   short segmental V2 enhancement and reconstitution in the V4 segment   likely reflecting high-grade stenosis/occlusion.  Scattered atherosclerotic plaques in bilateral common and internal   carotid arteries resulting up to mild stenosis in proximal bilateral ICAs   (less than 50%).  Attending note:  These findings and the change from the preliminary interpretation have   been communicated by Dr. Luna to the Dr. Britton on 10/9/2023 at 9:00 AM.  < end of copied text >  < from: CT Head No Cont (10.08.23 @ 21:53) >  IMPRESSION:  No evidence for acute intracranial hemorrhage, territorial infarct or   midline shift.  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 Can consider MRI for further evaluation if warranted.  Moderate chronic microvascular ischemic change.  < end of copied text >  < from: MR Head No Cont (10.09.23 @ 19:57) >  IMPRESSION:  No acute intracranial pathology.  Moderate chronic microvascular ischemic changes and small bilateral   cerebellar chronic infarcts.  < end of copied text >      Assessment:   Patient is a 63 year old male, history of HTN, DM, HLD, CAD s/p CABG/stents presenting with two episodes of right eye floaters, now with progressive vision loss of the right eye. Patient found with right eye retinal detachment, ophthalmology following. CTH reporting punctate left cerebellar infarct (likely chronic), CTA reporting right vertebral artery high grade stenosis v occlusion. Neuroendovascular team consulted for the above findings. MRI brain negative. Planning for opthalmology procedure tomorrow.     Suggestions:  - No intervention from neuroendovascular standpoint at this time   - Continue DAPT - aspirin 81 mg QD and brilinta 90 mg BID   - Continue high dose statin   - Recommending avoid hypotension, hypoperfusion, hypovolemia intraop.     Can arrange for outpatient follow up with Dr Mccoy on discharge.   Discussed with Dr Mccoy.   [id number] neuroendovascular 
24H events:    Patient is a 63y old Male who presents with a chief complaint of right eye floaters (10 Oct 2023 20:51)    Primary diagnosis of Retinal detachment, right      Today is hospital day 2d. This morning patient was seen and examined at bedside,patient is anxious . reporting blurry vision in right eye       PAST MEDICAL & SURGICAL HISTORY  HTN, age 0-18    DM (diabetes mellitus)    HLD (hyperlipidemia)    CAD (coronary atherosclerotic disease)    S/P CABG x 3    Presence of stent of CABG      SOCIAL HISTORY:  Social History:  Substance Use (street drugs): (+) daily marijuana user  Tobacco Usage:  (+) current smoker, smokes 10 cigarettes a day.   Alcohol Usage: None (09 Oct 2023 01:28)      ALLERGIES:  No Known Allergies    MEDICATIONS:  STANDING MEDICATIONS  aspirin enteric coated 81 milliGRAM(s) Oral daily  atorvastatin 40 milliGRAM(s) Oral at bedtime  dextrose 5%. 1000 milliLiter(s) IV Continuous <Continuous>  dextrose 50% Injectable 25 Gram(s) IV Push once  glucagon  Injectable 1 milliGRAM(s) IntraMuscular once  heparin   Injectable 5000 Unit(s) SubCutaneous every 12 hours  influenza   Vaccine 0.5 milliLiter(s) IntraMuscular once  insulin lispro (ADMELOG) corrective regimen sliding scale   SubCutaneous three times a day before meals  lisinopril 40 milliGRAM(s) Oral daily  metoprolol succinate ER 25 milliGRAM(s) Oral daily  NIFEdipine XL 30 milliGRAM(s) Oral daily  pantoprazole    Tablet 40 milliGRAM(s) Oral before breakfast    PRN MEDICATIONS  acetaminophen     Tablet .. 650 milliGRAM(s) Oral every 6 hours PRN  dextrose Oral Gel 15 Gram(s) Oral once PRN    VITALS:   T(F): 97.1  HR: 55  BP: 132/72  RR: 18  SpO2: 95%    PHYSICAL EXAM:  GENERAL:   (x) NAD, lying in bed comfortably     (  ) obtunded     (  ) lethargic     (  ) somnolent    HEAD:   (x  ) Atraumatic     (  ) hematoma     (  ) laceration (specify location:       )     NECK:  (x ) Supple     (  ) neck stiffness     (  ) nuchal rigidity     (  )  no JVD     (  ) JVD present ( -- cm)    HEART:  Rate -->     ( x ) normal rate     (  ) bradycardic     (  ) tachycardic  Rhythm -->     ( x ) regular     (  ) regularly irregular     (  ) irregularly irregular  Murmurs -->     ( x ) normal s1s2     (  ) systolic murmur     (  ) diastolic murmur     (  ) continuous murmur      (  ) S3 present     (  ) S4 present    LUNGS:   (x  )Unlabored respirations     (  ) tachypnea  (x  ) B/L air entry     (  ) decreased breath sounds in:  (location     )    (  x) no adventitious sound     (  ) crackles     (  ) wheezing      (  ) rhonchi      (specify location:       )  (  ) chest wall tenderness (specify location:       )    ABDOMEN:   (x  ) Soft     (  ) tense   |   (  ) nondistended     (  ) distended   |   (  ) +BS     (  ) hypoactive bowel sounds     (  ) hyperactive bowel sounds  ( x ) nontender     (  ) RUQ tenderness     (  ) RLQ tenderness     (  ) LLQ tenderness     (  ) epigastric tenderness     (  ) diffuse tenderness  (  ) Splenomegaly      (  ) Hepatomegaly      (  ) Jaundice     (  ) ecchymosis     EXTREMITIES:  (x  ) Normal     (  ) Rash     (  ) ecchymosis     (  ) varicose veins      (  ) pitting edema     (  ) non-pitting edema   (  ) ulceration     (  ) gangrene:     (location:     )    NERVOUS SYSTEM:    (x  ) A&Ox3     (  ) confused     (  ) lethargic    Upper extremities:     ( x ) no sensorimotor deficits     (  ) weakness     (  ) loss of proprioception/vibration     (  ) loss of touch/temperature (specify:    )  Lower extremities:     ( x ) no sensorimotor deficits     (  ) weakness     (  ) loss of proprioception/vibration     (  ) loss of touch/temperature (specify:    )    SKIN:   ( x ) No rashes or lesions     (  ) maculopapular rash     (  ) pustules     (  ) vesicles     (  ) ulcer     (  ) ecchymosis     (specify location:     )        LABS:                        14.1   7.99  )-----------( 231      ( 10 Oct 2023 06:50 )             41.3     10-10    139  |  104  |  13  ----------------------------<  143<H>  4.2   |  21  |  0.8    Ca    9.4      10 Oct 2023 06:50  Mg     2.2     10-09    TPro  6.8  /  Alb  4.2  /  TBili  0.6  /  DBili  x   /  AST  23  /  ALT  30  /  AlkPhos  143<H>  10-10      Urinalysis Basic - ( 10 Oct 2023 06:50 )    Color: x / Appearance: x / SG: x / pH: x  Gluc: 143 mg/dL / Ketone: x  / Bili: x / Urobili: x   Blood: x / Protein: x / Nitrite: x   Leuk Esterase: x / RBC: x / WBC x   Sq Epi: x / Non Sq Epi: x / Bacteria: x                RADIOLOGY:          
24H events:    Patient is a 63y old Male who presents with a chief complaint of   Primary diagnosis of Retinal detachment, right      Today is hospital day . This morning patient was seen and examined at bedside, resting comfortably in bed.    No acute or major events overnight.    Code Status:full        PAST MEDICAL & SURGICAL HISTORY  HTN, age 0-18    DM (diabetes mellitus)    HLD (hyperlipidemia)    CAD (coronary atherosclerotic disease)    S/P CABG x 3    Presence of stent of CABG      SOCIAL HISTORY:  Social History:  Substance Use (street drugs): (+) daily marijuana user  Tobacco Usage:  (+) current smoker, smokes 10 cigarettes a day.   Alcohol Usage: None (09 Oct 2023 01:28)      ALLERGIES:  No Known Allergies    MEDICATIONS:  STANDING MEDICATIONS  aspirin enteric coated 81 milliGRAM(s) Oral daily  atorvastatin 40 milliGRAM(s) Oral at bedtime  clopidogrel Tablet 75 milliGRAM(s) Oral daily  dextrose 5%. 1000 milliLiter(s) IV Continuous <Continuous>  dextrose 50% Injectable 25 Gram(s) IV Push once  glucagon  Injectable 1 milliGRAM(s) IntraMuscular once  heparin   Injectable 5000 Unit(s) SubCutaneous every 12 hours  influenza   Vaccine 0.5 milliLiter(s) IntraMuscular once  insulin lispro (ADMELOG) corrective regimen sliding scale   SubCutaneous three times a day before meals  lisinopril 20 milliGRAM(s) Oral daily  metoprolol succinate ER 25 milliGRAM(s) Oral daily  pantoprazole    Tablet 40 milliGRAM(s) Oral before breakfast    PRN MEDICATIONS  acetaminophen     Tablet .. 650 milliGRAM(s) Oral every 6 hours PRN  dextrose Oral Gel 15 Gram(s) Oral once PRN    VITALS:   T(F): 97.1  HR: 47  BP: 149/72  RR: 18  SpO2: 98%    PHYSICAL EXAM:  GENERAL:   ( x ) NAD, lying in bed comfortably     (  ) obtunded     (  ) lethargic     (  ) somnolent    HEAD:   ( x ) Atraumatic     (  ) hematoma     (  ) laceration (specify location:          NECK:  ( x ) Supple     (  ) neck stiffness     (  ) nuchal rigidity     (  )  no JVD     (  ) JVD present ( -- cm)    HEART:  Rate -->     (x  ) normal rate     (  ) bradycardic     (  ) tachycardic  Rhythm -->     (x  ) regular     (  ) regularly irregular     (  ) irregularly irregular  Murmurs -->     (x  ) normal s1s2     (  ) systolic murmur     (  ) diastolic murmur     (  ) continuous murmur      (  ) S3 present     (  ) S4 present    LUNGS:   (  x)Unlabored respirations     (  ) tachypnea  ( x ) B/L air entry     (  ) decreased breath sounds in:  (location     )    (x  ) no adventitious sound     (  ) crackles     (  ) wheezing      (  ) rhonchi      (specify location:       )  (  ) chest wall tenderness (specify location:       )    ABDOMEN:   ( x ) Soft     (  ) tense   |   (  ) nondistended     (  ) distended   |   (  ) +BS     (  ) hypoactive bowel sounds     (  ) hyperactive bowel sounds  (x  ) nontender     (  ) RUQ tenderness     (  ) RLQ tenderness     (  ) LLQ tenderness     (  ) epigastric tenderness     (  ) diffuse tenderness  (  ) Splenomegaly      (  ) Hepatomegaly      (  ) Jaundice     (  ) ecchymosis     EXTREMITIES:  ( x ) Normal     (  ) Rash     (  ) ecchymosis     (  ) varicose veins      (  ) pitting edema     (  ) non-pitting edema   (  ) ulceration     (  ) gangrene:     (location:     )    NERVOUS SYSTEM:    (x  ) A&Ox3     (  ) confused     (  ) lethargic  CN II-XII:     (x  ) Intact     (  ) deficits found     (Specify:     )   Upper extremities:     ( x ) no sensorimotor deficits     (  ) weakness     (  ) loss of proprioception/vibration     (  ) loss of touch/temperature (specify:    )  Lower extremities:     ( x ) no sensorimotor deficits     (  ) weakness     (  ) loss of proprioception/vibration     (  ) loss of touch/temperature (specify:    )        (  ) Indwelling Salazar Catheter:   Date insterted:    Reason (  ) Critical illness     (  ) urinary retention    (  ) Accurate Ins/Outs Monitoring     (  ) CMO patient    (  ) Central Line:   Date inserted:  Location: (  ) Right IJ     (  ) Left IJ     (  ) Right Fem     (  ) Left Fem    (  ) SPC        (  ) pigtail       (  ) PEG tube       (  ) colostomy       (  ) jejunostomy  (  ) U-Dall    LABS:                        16.0   8.75  )-----------( 245      ( 08 Oct 2023 20:33 )             45.8     10-08    140  |  102  |  14  ----------------------------<  148<H>  4.0   |  26  |  0.9    Ca    10.1      08 Oct 2023 20:33    TPro  8.2<H>  /  Alb  5.2  /  TBili  0.4  /  DBili  x   /  AST  23  /  ALT  28  /  AlkPhos  164<H>  10-08    PT/INR - ( 08 Oct 2023 20:33 )   PT: 11.80 sec;   INR: 1.03 ratio         PTT - ( 08 Oct 2023 20:33 )  PTT:42.1 sec  Urinalysis Basic - ( 08 Oct 2023 20:33 )    Color: x / Appearance: x / SG: x / pH: x  Gluc: 148 mg/dL / Ketone: x  / Bili: x / Urobili: x   Blood: x / Protein: x / Nitrite: x   Leuk Esterase: x / RBC: x / WBC x   Sq Epi: x / Non Sq Epi: x / Bacteria: x        Troponin T, Serum: <0.01 ng/mL (10-08-23 @ 20:33)      CARDIAC MARKERS ( 08 Oct 2023 20:33 )  x     / <0.01 ng/mL / x     / x     / x          Patient is a 64 y/o M with a pmhx of DM, HTN, HLD, CAD s/p CABG/ stents who presents with right eye floaters associated with a headache.      #Retinal detachment  - opthalmo consulted, f.u recs  - CT angio chest: Loss of vascular enhancement in the right cervical vertebral artery with   short segmental V2 enhancement and reconstitution in the V4 segment   likely reflecting high-grade stenosis/occlusion.  - neurology consulted      #HTN  Monitor vital signs.  Continue with lisinopril 40mg daily    #HLD  Continue with statin.    #DM  Monitor FSBS.    #CAD s/p CABG  Continue home medications..      VTE: Plavix.  GI ppx: Not indicated.  Pending (specify): neuroendovascular consult  Disposition: home             
Patient is a 63y old  Male who presents with a chief complaint of Loss of vision right eye (10 Oct 2023 11:23)    INTERVAL HPI/OVERNIGHT EVENTS: Patient was examined and seen at bedside. This morning pt is resting comfortably in bed and reports being anxious about loss of vision. Was unable to sleep last night.   BP is better.  ROS: Denies CP, SOB, AP, new weakness  All other systems reviewed and are within normal limits.  InitialHPI:  Patient is a 62 y/o M with a pmhx of DM, HTN, HLD, CAD s/p CABG/ stents who presents with right eye floaters associated with a headache. Patient states similar episode occurred 2 months ago and subsided on its own. However, 1 week ago symptoms re-occurred and has been progressively since. Describes it as a "covering to the eye" and only able to see a green light from the upper part of the visual field. No visual changes to the left eye. Denies trauma,  fever, chills, nausea, vomiting, abdominal pain, diarrhea, dizziness, weakness, chest pain, SOB, back pain, LOC, trauma, urinary symptoms, cough, calf pain/swelling, recent travel, recent surgery. (09 Oct 2023 01:28)    PAST MEDICAL & SURGICAL HISTORY:  HTN, age 0-18      DM (diabetes mellitus)      HLD (hyperlipidemia)      CAD (coronary atherosclerotic disease)      S/P CABG x 3      Presence of stent of CABG          General: NAD, AAO3  HEENT:  EOMI, no LAD, near complete vision loss in Rt eye  CV: S1 S2  Resp: decreased breath sounds at bases  GI: NT/ND/S +BS  MS: no clubbing/cyanosis/edema, + pulses b/l  Neuro: nonfocal, +reflexes thruout          Home Medications:  Lipitor 40 mg oral tablet: 1 tab(s) orally once a day (09 Oct 2023 01:13)  metFORMIN 500 mg oral tablet: 1 tab(s) orally 2 times a day (11 Oct 2023 15:50)  metoprolol succinate 25 mg oral tablet, extended release: 1 tab(s) orally once a day (11 Oct 2023 14:47)    MEDICATIONS  (STANDING):  aspirin enteric coated 81 milliGRAM(s) Oral daily  atorvastatin 40 milliGRAM(s) Oral at bedtime  clopidogrel Tablet 75 milliGRAM(s) Oral daily  dextrose 5%. 1000 milliLiter(s) (50 mL/Hr) IV Continuous <Continuous>  dextrose 50% Injectable 25 Gram(s) IV Push once  glucagon  Injectable 1 milliGRAM(s) IntraMuscular once  heparin   Injectable 5000 Unit(s) SubCutaneous every 12 hours  influenza   Vaccine 0.5 milliLiter(s) IntraMuscular once  insulin lispro (ADMELOG) corrective regimen sliding scale   SubCutaneous three times a day before meals  lactated ringers. 1000 milliLiter(s) (75 mL/Hr) IV Continuous <Continuous>  lisinopril 40 milliGRAM(s) Oral daily  metoprolol succinate ER 25 milliGRAM(s) Oral daily  NIFEdipine XL 30 milliGRAM(s) Oral daily  pantoprazole    Tablet 40 milliGRAM(s) Oral before breakfast    MEDICATIONS  (PRN):  acetaminophen     Tablet .. 650 milliGRAM(s) Oral every 6 hours PRN Temp greater or equal to 38C (100.4F), Mild Pain (1 - 3)  dextrose Oral Gel 15 Gram(s) Oral once PRN Blood Glucose LESS THAN 70 milliGRAM(s)/deciliter  HYDROmorphone  Injectable 0.5 milliGRAM(s) IV Push every 10 minutes PRN Moderate Pain (4 - 6)  ondansetron Injectable 4 milliGRAM(s) IV Push once PRN Nausea and/or Vomiting    Vital Signs Last 24 Hrs  T(C): 36.4 (11 Oct 2023 17:50), Max: 36.4 (11 Oct 2023 17:05)  T(F): 97.6 (11 Oct 2023 17:50), Max: 97.6 (11 Oct 2023 17:50)  HR: 64 (11 Oct 2023 18:05) (55 - 65)  BP: 125/61 (11 Oct 2023 18:05) (123/58 - 158/92)  BP(mean): 83 (11 Oct 2023 17:05) (83 - 96)  RR: 14 (11 Oct 2023 18:05) (14 - 18)  SpO2: 97% (11 Oct 2023 18:05) (95% - 99%)    Parameters below as of 11 Oct 2023 17:05  Patient On (Oxygen Delivery Method): room air      CAPILLARY BLOOD GLUCOSE      POCT Blood Glucose.: 143 mg/dL (11 Oct 2023 14:22)  POCT Blood Glucose.: 163 mg/dL (11 Oct 2023 11:35)  POCT Blood Glucose.: 176 mg/dL (11 Oct 2023 08:04)  POCT Blood Glucose.: 215 mg/dL (10 Oct 2023 21:29)    LABS:                        14.1   7.99  )-----------( 231      ( 10 Oct 2023 06:50 )             41.3     10-10    139  |  104  |  13  ----------------------------<  143<H>  4.2   |  21  |  0.8    Ca    9.4      10 Oct 2023 06:50    TPro  6.8  /  Alb  4.2  /  TBili  0.6  /  DBili  x   /  AST  23  /  ALT  30  /  AlkPhos  143<H>  10-10    LIVER FUNCTIONS - ( 10 Oct 2023 06:50 )  Alb: 4.2 g/dL / Pro: 6.8 g/dL / ALK PHOS: 143 U/L / ALT: 30 U/L / AST: 23 U/L / GGT: x                 Urinalysis Basic - ( 10 Oct 2023 06:50 )    Color: x / Appearance: x / SG: x / pH: x  Gluc: 143 mg/dL / Ketone: x  / Bili: x / Urobili: x   Blood: x / Protein: x / Nitrite: x   Leuk Esterase: x / RBC: x / WBC x   Sq Epi: x / Non Sq Epi: x / Bacteria: x              Consultant Notes Reviewed:  [x ] YES  [ ] NO  Care Discussed with Consultants/Other Providers/ Housestaff [ x] YES  [ ] NO  Radiology, labs, new studies personally reviewed.                                                                  
Patient is a 63y old  Male who presents with a chief complaint of Loss of vision right eye (10 Oct 2023 11:23)    INTERVAL HPI/OVERNIGHT EVENTS: Patient was examined and seen at bedside. This morning pt is resting comfortably in bed and reports no new issues or overnight events. No complaints, feels ok. Went to eye clinic today. BP is high.  ROS: Denies CP, SOB, AP, new weakness  All other systems reviewed and are within normal limits.  InitialHPI:  Patient is a 64 y/o M with a pmhx of DM, HTN, HLD, CAD s/p CABG/ stents who presents with right eye floaters associated with a headache. Patient states similar episode occurred 2 months ago and subsided on its own. However, 1 week ago symptoms re-occurred and has been progressively since. Describes it as a "covering to the eye" and only able to see a green light from the upper part of the visual field. No visual changes to the left eye. Denies trauma,  fever, chills, nausea, vomiting, abdominal pain, diarrhea, dizziness, weakness, chest pain, SOB, back pain, LOC, trauma, urinary symptoms, cough, calf pain/swelling, recent travel, recent surgery. (09 Oct 2023 01:28)    PAST MEDICAL & SURGICAL HISTORY:  HTN, age 0-18      DM (diabetes mellitus)      HLD (hyperlipidemia)      CAD (coronary atherosclerotic disease)      S/P CABG x 3      Presence of stent of CABG          General: NAD, AAO3  HEENT:  EOMI, no LAD, near complete vision loss in Rt eye  CV: S1 S2  Resp: decreased breath sounds at bases  GI: NT/ND/S +BS  MS: no clubbing/cyanosis/edema, + pulses b/l  Neuro: nonfocal, +reflexes thruout    MEDICATIONS  (STANDING):  aspirin enteric coated 81 milliGRAM(s) Oral daily  atorvastatin 40 milliGRAM(s) Oral at bedtime  dextrose 5%. 1000 milliLiter(s) (50 mL/Hr) IV Continuous <Continuous>  dextrose 50% Injectable 25 Gram(s) IV Push once  glucagon  Injectable 1 milliGRAM(s) IntraMuscular once  heparin   Injectable 5000 Unit(s) SubCutaneous every 12 hours  influenza   Vaccine 0.5 milliLiter(s) IntraMuscular once  insulin lispro (ADMELOG) corrective regimen sliding scale   SubCutaneous three times a day before meals  lisinopril 40 milliGRAM(s) Oral daily  metoprolol succinate ER 25 milliGRAM(s) Oral daily  NIFEdipine XL 30 milliGRAM(s) Oral daily  pantoprazole    Tablet 40 milliGRAM(s) Oral before breakfast  ticagrelor 90 milliGRAM(s) Oral once    MEDICATIONS  (PRN):  acetaminophen     Tablet .. 650 milliGRAM(s) Oral every 6 hours PRN Temp greater or equal to 38C (100.4F), Mild Pain (1 - 3)  dextrose Oral Gel 15 Gram(s) Oral once PRN Blood Glucose LESS THAN 70 milliGRAM(s)/deciliter    Vital Signs Last 24 Hrs  T(C): 36.3 (10 Oct 2023 20:18), Max: 36.8 (09 Oct 2023 21:00)  T(F): 97.4 (10 Oct 2023 20:18), Max: 98.2 (09 Oct 2023 21:00)  HR: 64 (10 Oct 2023 20:18) (53 - 64)  BP: 174/99 (10 Oct 2023 20:18) (140/80 - 176/94)  BP(mean): 110 (10 Oct 2023 17:04) (104 - 110)  RR: 18 (10 Oct 2023 20:18) (18 - 19)  SpO2: 98% (10 Oct 2023 13:25) (96% - 98%)    Parameters below as of 10 Oct 2023 13:25  Patient On (Oxygen Delivery Method): room air      CAPILLARY BLOOD GLUCOSE      POCT Blood Glucose.: 155 mg/dL (10 Oct 2023 16:48)  POCT Blood Glucose.: 172 mg/dL (10 Oct 2023 12:14)  POCT Blood Glucose.: 140 mg/dL (10 Oct 2023 07:39)  POCT Blood Glucose.: 176 mg/dL (09 Oct 2023 21:01)                          14.1   7.99  )-----------( 231      ( 10 Oct 2023 06:50 )             41.3     10-10    139  |  104  |  13  ----------------------------<  143<H>  4.2   |  21  |  0.8    Ca    9.4      10 Oct 2023 06:50  Mg     2.2     10-09    TPro  6.8  /  Alb  4.2  /  TBili  0.6  /  DBili  x   /  AST  23  /  ALT  30  /  AlkPhos  143<H>  10-10    LIVER FUNCTIONS - ( 10 Oct 2023 06:50 )  Alb: 4.2 g/dL / Pro: 6.8 g/dL / ALK PHOS: 143 U/L / ALT: 30 U/L / AST: 23 U/L / GGT: x                 Urinalysis Basic - ( 10 Oct 2023 06:50 )    Color: x / Appearance: x / SG: x / pH: x  Gluc: 143 mg/dL / Ketone: x  / Bili: x / Urobili: x   Blood: x / Protein: x / Nitrite: x   Leuk Esterase: x / RBC: x / WBC x   Sq Epi: x / Non Sq Epi: x / Bacteria: x              Chart, Consultant(s) Notes Reviewed:  [x ] YES  [ ] NO  Care Discussed with Consultants/Other Providers/ Housestaff [ x] YES  [ ] NO  Radiology, labs, old available records personally reviewed.

## 2023-10-11 NOTE — PRE-ANESTHESIA EVALUATION ADULT - NSANTHADDINFOFT_GEN_ALL_CORE
Neurology and opthalmology  notes reviewed, vision saving procedure, can not delay for optimization.    Would proceed for the surgery

## 2023-10-11 NOTE — DISCHARGE NOTE PROVIDER - PROVIDER TOKENS
PROVIDER:[TOKEN:[[id number]],FOLLOWUP:[1-3 days]] PROVIDER:[TOKEN:[[id number]],SCHEDULEDAPPT:[10/12/2023]],FREE:[LAST:[Primary Medical Doctor],PHONE:[(   )    -],FAX:[(   )    -],FOLLOWUP:[1 week]],PROVIDER:[TOKEN:[[id number]],FOLLOWUP:[2 weeks]] PROVIDER:[TOKEN:[[id number]],SCHEDULEDAPPT:[10/12/2023]],PROVIDER:[TOKEN:[[id number]],FOLLOWUP:[2 weeks]],FREE:[LAST:[Primary Medical Doctor],PHONE:[(   )    -],FAX:[(   )    -],FOLLOWUP:[1 week]],PROVIDER:[TOKEN:[[id number]],FOLLOWUP:[1 month]]

## 2023-10-11 NOTE — DISCHARGE NOTE PROVIDER - CARE PROVIDER_API CALL
Dennis Conrad  Ophthalmology  [address] [facility], [address]  [place] [address]  Phone: [phone]  Fax: [phone]  Follow Up Time: 1-3 days   Dennis Conrad  Ophthalmology  [address] [facility], [address]  [place] [address]  Phone: [phone]  Fax: [phone]  Scheduled Appointment: 10/12/2023    Primary Medical Doctor,   Phone: (   )    -  Fax: (   )    -  Follow Up Time: 1 week    Jose Baptiste  Cardiovascular Disease  [address] [place] [address]  Phone: [phone]  Fax: [phone]  Follow Up Time: 2 weeks   Dennis Conrad  Ophthalmology  [address] [facility]  [place] [address]  Phone: [phone]  Fax: [phone]  Scheduled Appointment: 10/12/2023    Jose Baptiste  Cardiovascular Disease  [address] [place] [address]  Phone: [phone]  Fax: [phone]  Follow Up Time: 2 weeks    Primary Medical Doctor,   Phone: (   )    -  Fax: (   )    -  Follow Up Time: 1 week    Keith Mccoy  Neurosurgery  [address] [place] [address], [address]  [place] [address]  Phone: [phone]  Fax: [phone]  Follow Up Time: 1 month

## 2023-10-11 NOTE — DISCHARGE NOTE PROVIDER - NSDCCPCAREPLAN_GEN_ALL_CORE_FT
PRINCIPAL DISCHARGE DIAGNOSIS  Diagnosis: Retinal detachment, right  Assessment and Plan of Treatment: you have been diagnosed with retinal detachement of the right eye :  Retinal detachment describes an emergency situation in which a thin layer of tissue (the retina) at the back of the eye pulls away from its normal position.  Retinal detachment separates the retinal cells from the layer of blood vessels that provides oxygen and nourishment to the eye. The longer retinal detachment goes untreated, the greater your risk of permanent vision loss in the affected eye.  Warning signs of retinal detachment may include one or all of the following: reduced vision and the sudden appearance of floaters and flashes of light. Contacting an eye specialist (ophthalmologist) right away can help save your vision.  Rest when you feel tired.  Allow the eye to heal. Don't do things where you might move your head. This includes moving quickly, lifting anything heavy, or doing activities such as cleaning or gardening.  You may drive when your vision allows it. If you are not sure, ask your doctor.       PRINCIPAL DISCHARGE DIAGNOSIS  Diagnosis: Retinal detachment, right  Assessment and Plan of Treatment: you have been diagnosed with retinal detachement of the right eye :  Retinal detachment describes an emergency situation in which a thin layer of tissue (the retina) at the back of the eye pulls away from its normal position.  Retinal detachment separates the retinal cells from the layer of blood vessels that provides oxygen and nourishment to the eye. The longer retinal detachment goes untreated, the greater your risk of permanent vision loss in the affected eye.  Warning signs of retinal detachment may include one or all of the following: reduced vision and the sudden appearance of floaters and flashes of light. Contacting an eye specialist (ophthalmologist) right away can help save your vision.  Rest when you feel tired.  Allow the eye to heal. Don't do things where you might move your head. This includes moving quickly, lifting anything heavy, or doing activities such as cleaning or gardening.  Do not drive until cleared by ophthalmologist. Please follow up with Dr. Conrad the day after surgery.        SECONDARY DISCHARGE DIAGNOSES  Diagnosis: Vertebral artery stenosis, right  Assessment and Plan of Treatment: Please abstain from smoking, please take your medications as instructed. Monitor your BP at home and bring results to your PMD and cardiologist. Call your doctors if your BP still high.    Diagnosis: Hypertension  Assessment and Plan of Treatment: we added Nifedipine to your medication list and increased Lisinopril to 40mg daily     PRINCIPAL DISCHARGE DIAGNOSIS  Diagnosis: Retinal detachment, right  Assessment and Plan of Treatment: you have been diagnosed with retinal detachement of the right eye :  Retinal detachment describes an emergency situation in which a thin layer of tissue (the retina) at the back of the eye pulls away from its normal position.  Retinal detachment separates the retinal cells from the layer of blood vessels that provides oxygen and nourishment to the eye. The longer retinal detachment goes untreated, the greater your risk of permanent vision loss in the affected eye.  Warning signs of retinal detachment may include one or all of the following: reduced vision and the sudden appearance of floaters and flashes of light. Contacting an eye specialist (ophthalmologist) right away can help save your vision.  Rest when you feel tired.  Allow the eye to heal. Don't do things where you might move your head. This includes moving quickly, lifting anything heavy, or doing activities such as cleaning or gardening.  Do not drive until cleared by ophthalmologist. Please follow up with Dr. Conrad the day after surgery.        SECONDARY DISCHARGE DIAGNOSES  Diagnosis: Vertebral artery stenosis, right  Assessment and Plan of Treatment: Please abstain from smoking, please take your medications as instructed. Monitor your BP at home and bring results to your PMD and cardiologist. Call your doctors if your BP still high. Please follow up with neurointerventional specialist Dr. Mccoy.    Diagnosis: Hypertension  Assessment and Plan of Treatment: we added Nifedipine to your medication list and increased Lisinopril to 40mg daily

## 2023-10-11 NOTE — PRE-ANESTHESIA EVALUATION ADULT - NSANTHOSAYNRD_GEN_A_CORE
No. LUCIA screening performed.  STOP BANG Legend: 0-2 = LOW Risk; 3-4 = INTERMEDIATE Risk; 5-8 = HIGH Risk
No. LUCIA screening performed.  STOP BANG Legend: 0-2 = LOW Risk; 3-4 = INTERMEDIATE Risk; 5-8 = HIGH Risk

## 2023-10-17 DIAGNOSIS — F12.90 CANNABIS USE, UNSPECIFIED, UNCOMPLICATED: ICD-10-CM

## 2023-10-17 DIAGNOSIS — H33.21 SEROUS RETINAL DETACHMENT, RIGHT EYE: ICD-10-CM

## 2023-10-17 DIAGNOSIS — Z95.5 PRESENCE OF CORONARY ANGIOPLASTY IMPLANT AND GRAFT: ICD-10-CM

## 2023-10-17 DIAGNOSIS — I65.01 OCCLUSION AND STENOSIS OF RIGHT VERTEBRAL ARTERY: ICD-10-CM

## 2023-10-17 DIAGNOSIS — E78.5 HYPERLIPIDEMIA, UNSPECIFIED: ICD-10-CM

## 2023-10-17 DIAGNOSIS — I25.10 ATHEROSCLEROTIC HEART DISEASE OF NATIVE CORONARY ARTERY WITHOUT ANGINA PECTORIS: ICD-10-CM

## 2023-10-17 DIAGNOSIS — I10 ESSENTIAL (PRIMARY) HYPERTENSION: ICD-10-CM

## 2023-10-17 DIAGNOSIS — Z79.02 LONG TERM (CURRENT) USE OF ANTITHROMBOTICS/ANTIPLATELETS: ICD-10-CM

## 2023-10-17 DIAGNOSIS — F17.200 NICOTINE DEPENDENCE, UNSPECIFIED, UNCOMPLICATED: ICD-10-CM

## 2023-10-17 DIAGNOSIS — E11.39 TYPE 2 DIABETES MELLITUS WITH OTHER DIABETIC OPHTHALMIC COMPLICATION: ICD-10-CM

## 2023-10-17 DIAGNOSIS — Z79.84 LONG TERM (CURRENT) USE OF ORAL HYPOGLYCEMIC DRUGS: ICD-10-CM

## 2023-10-18 LAB
GLUCOSE BLDC GLUCOMTR-MCNC: 134 MG/DL — HIGH (ref 70–99)
GLUCOSE BLDC GLUCOMTR-MCNC: 134 MG/DL — HIGH (ref 70–99)

## 2023-10-19 DIAGNOSIS — H33.41 TRACTION DETACHMENT OF RETINA, RIGHT EYE: ICD-10-CM

## 2024-03-05 ENCOUNTER — OUTPATIENT (OUTPATIENT)
Dept: OUTPATIENT SERVICES | Facility: HOSPITAL | Age: 64
LOS: 1 days | End: 2024-03-05
Payer: MEDICARE

## 2024-03-05 ENCOUNTER — APPOINTMENT (OUTPATIENT)
Dept: OPHTHALMOLOGY | Facility: CLINIC | Age: 64
End: 2024-03-05
Payer: MEDICARE

## 2024-03-05 DIAGNOSIS — Z95.5 PRESENCE OF CORONARY ANGIOPLASTY IMPLANT AND GRAFT: Chronic | ICD-10-CM

## 2024-03-05 DIAGNOSIS — H25.89 OTHER AGE-RELATED CATARACT: ICD-10-CM

## 2024-03-05 PROBLEM — Z95.1 PRESENCE OF AORTOCORONARY BYPASS GRAFT: Chronic | Status: ACTIVE | Noted: 2023-10-09

## 2024-03-05 PROBLEM — E11.9 TYPE 2 DIABETES MELLITUS WITHOUT COMPLICATIONS: Chronic | Status: ACTIVE | Noted: 2023-10-09

## 2024-03-05 PROBLEM — I10 ESSENTIAL (PRIMARY) HYPERTENSION: Chronic | Status: ACTIVE | Noted: 2023-10-08

## 2024-03-05 PROBLEM — E78.5 HYPERLIPIDEMIA, UNSPECIFIED: Chronic | Status: ACTIVE | Noted: 2023-10-09

## 2024-03-05 PROBLEM — I25.10 ATHEROSCLEROTIC HEART DISEASE OF NATIVE CORONARY ARTERY WITHOUT ANGINA PECTORIS: Chronic | Status: ACTIVE | Noted: 2023-10-09

## 2024-03-05 PROCEDURE — 92136 OPHTHALMIC BIOMETRY: CPT | Mod: 26

## 2024-03-05 PROCEDURE — 92136 OPHTHALMIC BIOMETRY: CPT | Mod: 26,RT

## 2024-03-05 PROCEDURE — 92136 OPHTHALMIC BIOMETRY: CPT

## 2024-03-20 DIAGNOSIS — H25.13 AGE-RELATED NUCLEAR CATARACT, BILATERAL: ICD-10-CM

## 2024-04-01 ENCOUNTER — OUTPATIENT (OUTPATIENT)
Dept: OUTPATIENT SERVICES | Facility: HOSPITAL | Age: 64
LOS: 1 days | Discharge: HOME | End: 2024-04-01

## 2024-04-01 ENCOUNTER — TRANSCRIPTION ENCOUNTER (OUTPATIENT)
Age: 64
End: 2024-04-01

## 2024-04-01 VITALS
HEIGHT: 72 IN | OXYGEN SATURATION: 99 % | SYSTOLIC BLOOD PRESSURE: 144 MMHG | RESPIRATION RATE: 16 BRPM | WEIGHT: 190.04 LBS | TEMPERATURE: 97 F | HEART RATE: 57 BPM | DIASTOLIC BLOOD PRESSURE: 82 MMHG

## 2024-04-01 VITALS — HEART RATE: 58 BPM | RESPIRATION RATE: 19 BRPM | DIASTOLIC BLOOD PRESSURE: 91 MMHG | SYSTOLIC BLOOD PRESSURE: 147 MMHG

## 2024-04-01 DIAGNOSIS — H25.21 AGE-RELATED CATARACT, MORGAGNIAN TYPE, RIGHT EYE: ICD-10-CM

## 2024-04-01 DIAGNOSIS — Z95.5 PRESENCE OF CORONARY ANGIOPLASTY IMPLANT AND GRAFT: Chronic | ICD-10-CM

## 2024-04-01 LAB — GLUCOSE BLDC GLUCOMTR-MCNC: 165 MG/DL — HIGH (ref 70–99)

## 2024-04-01 PROCEDURE — V2632: CPT

## 2024-04-01 PROCEDURE — 82962 GLUCOSE BLOOD TEST: CPT

## 2024-04-01 RX ORDER — ATORVASTATIN CALCIUM 80 MG/1
1 TABLET, FILM COATED ORAL
Refills: 0 | DISCHARGE

## 2024-04-01 RX ORDER — EZETIMIBE 10 MG/1
1 TABLET ORAL
Refills: 0 | DISCHARGE

## 2024-04-01 NOTE — ASU DISCHARGE PLAN (ADULT/PEDIATRIC) - NS MD DC FALL RISK RISK
For information on Fall & Injury Prevention, visit: [email or web address] OR  [email or web address] OR  https://www.cdc.gov/steadi/patient.html

## 2024-04-03 DIAGNOSIS — H25.11 AGE-RELATED NUCLEAR CATARACT, RIGHT EYE: ICD-10-CM

## 2024-04-03 DIAGNOSIS — Z79.82 LONG TERM (CURRENT) USE OF ASPIRIN: ICD-10-CM

## 2024-04-03 DIAGNOSIS — Z79.02 LONG TERM (CURRENT) USE OF ANTITHROMBOTICS/ANTIPLATELETS: ICD-10-CM
